# Patient Record
Sex: MALE | Race: WHITE | NOT HISPANIC OR LATINO | Employment: FULL TIME | URBAN - METROPOLITAN AREA
[De-identification: names, ages, dates, MRNs, and addresses within clinical notes are randomized per-mention and may not be internally consistent; named-entity substitution may affect disease eponyms.]

---

## 2017-01-29 ENCOUNTER — APPOINTMENT (OUTPATIENT)
Dept: RADIOLOGY | Facility: MEDICAL CENTER | Age: 32
End: 2017-01-29
Attending: EMERGENCY MEDICINE
Payer: COMMERCIAL

## 2017-01-29 ENCOUNTER — HOSPITAL ENCOUNTER (EMERGENCY)
Facility: MEDICAL CENTER | Age: 32
End: 2017-01-29
Attending: EMERGENCY MEDICINE | Admitting: SPECIALIST
Payer: COMMERCIAL

## 2017-01-29 VITALS
TEMPERATURE: 98.2 F | DIASTOLIC BLOOD PRESSURE: 83 MMHG | BODY MASS INDEX: 24.38 KG/M2 | RESPIRATION RATE: 16 BRPM | SYSTOLIC BLOOD PRESSURE: 145 MMHG | HEART RATE: 101 BPM | HEIGHT: 72 IN | WEIGHT: 180 LBS | OXYGEN SATURATION: 91 %

## 2017-01-29 DIAGNOSIS — F10.920 ALCOHOL INTOXICATION, UNCOMPLICATED (HCC): ICD-10-CM

## 2017-01-29 DIAGNOSIS — S01.511A LACERATION OF VERMILION BORDER OF UPPER LIP, INITIAL ENCOUNTER: ICD-10-CM

## 2017-01-29 DIAGNOSIS — S01.511A LACERATION OF VERMILION BORDER OF LOWER LIP, INITIAL ENCOUNTER: ICD-10-CM

## 2017-01-29 PROCEDURE — 160039 HCHG SURGERY MINUTES - EA ADDL 1 MIN LEVEL 3: Performed by: SPECIALIST

## 2017-01-29 PROCEDURE — 99291 CRITICAL CARE FIRST HOUR: CPT

## 2017-01-29 PROCEDURE — 110371 HCHG SHELL REV 272: Performed by: SPECIALIST

## 2017-01-29 PROCEDURE — 160036 HCHG PACU - EA ADDL 30 MINS PHASE I: Performed by: SPECIALIST

## 2017-01-29 PROCEDURE — 160028 HCHG SURGERY MINUTES - 1ST 30 MINS LEVEL 3: Performed by: SPECIALIST

## 2017-01-29 PROCEDURE — 501838 HCHG SUTURE GENERAL: Performed by: SPECIALIST

## 2017-01-29 PROCEDURE — 700105 HCHG RX REV CODE 258: Performed by: EMERGENCY MEDICINE

## 2017-01-29 PROCEDURE — 96361 HYDRATE IV INFUSION ADD-ON: CPT

## 2017-01-29 PROCEDURE — 160009 HCHG ANES TIME/MIN: Performed by: SPECIALIST

## 2017-01-29 PROCEDURE — 160035 HCHG PACU - 1ST 60 MINS PHASE I: Performed by: SPECIALIST

## 2017-01-29 PROCEDURE — 160002 HCHG RECOVERY MINUTES (STAT): Performed by: SPECIALIST

## 2017-01-29 PROCEDURE — 70486 CT MAXILLOFACIAL W/O DYE: CPT

## 2017-01-29 PROCEDURE — 700111 HCHG RX REV CODE 636 W/ 250 OVERRIDE (IP): Performed by: EMERGENCY MEDICINE

## 2017-01-29 PROCEDURE — 70450 CT HEAD/BRAIN W/O DYE: CPT

## 2017-01-29 PROCEDURE — A4606 OXYGEN PROBE USED W OXIMETER: HCPCS | Performed by: SPECIALIST

## 2017-01-29 PROCEDURE — 96374 THER/PROPH/DIAG INJ IV PUSH: CPT

## 2017-01-29 PROCEDURE — 110382 HCHG SHELL REV 271: Performed by: SPECIALIST

## 2017-01-29 PROCEDURE — 700111 HCHG RX REV CODE 636 W/ 250 OVERRIDE (IP)

## 2017-01-29 PROCEDURE — 500888 HCHG PACK, MINOR BASIN: Performed by: SPECIALIST

## 2017-01-29 PROCEDURE — 700101 HCHG RX REV CODE 250

## 2017-01-29 PROCEDURE — 64400 NJX AA&/STRD TRIGEMINAL NRV: CPT

## 2017-01-29 PROCEDURE — 160048 HCHG OR STATISTICAL LEVEL 1-5: Performed by: SPECIALIST

## 2017-01-29 RX ORDER — ONDANSETRON 2 MG/ML
INJECTION INTRAMUSCULAR; INTRAVENOUS
Status: COMPLETED
Start: 2017-01-29 | End: 2017-01-29

## 2017-01-29 RX ORDER — SODIUM CHLORIDE 9 MG/ML
1000 INJECTION, SOLUTION INTRAVENOUS ONCE
Status: COMPLETED | OUTPATIENT
Start: 2017-01-29 | End: 2017-01-29

## 2017-01-29 RX ORDER — MIDAZOLAM HYDROCHLORIDE 1 MG/ML
INJECTION INTRAMUSCULAR; INTRAVENOUS
Status: DISCONTINUED
Start: 2017-01-29 | End: 2017-01-29 | Stop reason: HOSPADM

## 2017-01-29 RX ADMIN — FENTANYL CITRATE 25 MCG: 50 INJECTION, SOLUTION INTRAMUSCULAR; INTRAVENOUS at 11:15

## 2017-01-29 RX ADMIN — ONDANSETRON 4 MG: 2 INJECTION INTRAMUSCULAR; INTRAVENOUS at 11:07

## 2017-01-29 RX ADMIN — SODIUM CHLORIDE 1000 ML: 900 INJECTION, SOLUTION INTRAVENOUS at 07:15

## 2017-01-29 RX ADMIN — FENTANYL CITRATE 50 MCG: 50 INJECTION, SOLUTION INTRAMUSCULAR; INTRAVENOUS at 02:53

## 2017-01-29 ASSESSMENT — PAIN SCALES - GENERAL
PAINLEVEL_OUTOF10: 7
PAINLEVEL_OUTOF10: 6
PAINLEVEL_OUTOF10: ASSUMED PAIN PRESENT
PAINLEVEL_OUTOF10: 8
PAINLEVEL_OUTOF10: ASSUMED PAIN PRESENT
PAINLEVEL_OUTOF10: 0
PAINLEVEL_OUTOF10: 6

## 2017-01-29 NOTE — OP REPORT
DATE OF SERVICE:  01/29/2017    PREOPERATIVE DIAGNOSES:  1.  Complex laceration, right upper lip, 5 cm.  2.  Laceration, right cheek, 2 cm.  3.  Laceration, left oral commissure 2 cm.    POSTOPERATIVE DIAGNOSES:  1.  Complex laceration, right upper lip, 5 cm.  2.  Laceration, right cheek, 2 cm.  3.  Laceration, left oral commissure, 2 cm.  4.  'Through-and-through laceration, right upper lip.    SURGEON:  Omid Flowers MD    ANESTHESIOLOGIST:  Sue Ann MD    ANESTHESIA:  General endotracheal tube.    COMPLICATIONS:  None.    ESTIMATED BLOOD LOSS:  Less than 30 mL    INDICATIONS:  The patient is a 31-year-old male who sustained a significant   laceration to the right upper lip at the deep laceration through and through   involved both vermilion borders on the medial and lateral aspect of the   laceration.  He has 2 other lacerations, one on the right cheek, which is a   Simple type injury and then another one in the left oral commissure, which is a   multi-lacerated tear type injury.  The patient is here for operative washout,   debridement and repair of the above injuries.  Risks and benefits have been   explained, most notably scar formation, possible need for revisionary surgery,   possible numbness, possible difficulty with orbicularis muscle due to the   fact that it has been left torn in 2 different areas.  The patient understands   the risks and wished to proceed.    FINDINGS:  As above.    DESCRIPTION OF PROCEDURE:  The patient was taken to the operating room and   general anesthesia was induced, 1 gram Ancef was given prior to starting the   procedure.  Initially, we irrigated out the wounds removed any foreign body   that we saw, did sharply debride some of the edges of both the right upper lip   laceration and the right cheek laceration, discover that the laceration went   all the way through had lacerated the mucosa of the buccal mucosa.  Once again   assessed and cleaned all the injuries.   Initially, I used a 4-0 Vicryl suture   to reapproximate the orbicularis muscle on the deep surface of the upper lip.    We then did a second layer in the more superficial layer of the lip trying   to reapproximate the superior aspect of the orbicularis muscle and then   finally I used some 5-0 Vicryl sutures to approximate the mucosal edges.  I   also used 5-0 Vicryl sutures in the right cheek area in the deep dermal layer.    A 5-0 Prolene suture was used on the lip proper on the right side all the   way up to the vermilion border.  I had realigned the vermilion border as even   as I thought I could get.  There was some crush injury to the incision were   made precise vermilion border reconstruction somewhat difficult.  Laterally, I   felt that I was able to align up the vermilion border pretty well.  I used   some chromic sutures in the buccal mucosa to close it to make it watertight,   also used the chromic suture in the red portion of the lip and the left oral   commissure.  All sutures were interrupted fashion, too many sutures to count   on his cheek, one-side close the deep dermal layer.  I used a 5-0 Prolene to   close the skin edges.  Once the patient was cleaned up, removed the throat   pack that we had placed to start the case  I sucked out his stomach.  I injected some 1% lidocaine with   epinephrine at the beginning of the case for postoperative pain control.  The   patient was extubated in the operating room, returned to the PACU in stable   condition.  He tolerated the procedure well.       ____________________________________     MD BREE YU / LANDON    DD:  01/29/2017 11:04:39  DT:  01/29/2017 13:00:33    D#:  [id number]  Job#:  [id number]

## 2017-01-29 NOTE — ED NOTES
Laying in bed, in room with eyes closed.  Resp even et. Nonlabored, call light in reach, wife at bedside.

## 2017-01-29 NOTE — ED NOTES
Chief Complaint   Patient presents with   • Mouth Laceration     Pt reports he fell on the ice, hit his mouth and has a cut to the upper lip, also states he lost some of his fake teeth when he fell   • Fall     Slipped on ice

## 2017-01-29 NOTE — ED NOTES
The Medication Reconciliation has been completed per patient  Allergies have been reviewed  Antibiotic use in 30 days - NONE    Pharmacy:  NONE

## 2017-01-29 NOTE — DISCHARGE INSTRUCTIONS
ACTIVITY: Rest and take it easy for the first 24 hours.  A responsible adult is recommended to remain with you during that time.  It is normal to feel sleepy.  We encourage you to not do anything that requires balance, judgment or coordination.    MILD FLU-LIKE SYMPTOMS ARE NORMAL. YOU MAY EXPERIENCE GENERALIZED MUSCLE ACHES, THROAT IRRITATION, HEADACHE AND/OR SOME NAUSEA.    FOR 24 HOURS DO NOT:  Drive, operate machinery or run household appliances.  Drink beer or alcoholic beverages.   Make important decisions or sign legal documents.    SPECIAL INSTRUCTIONS: Elevate head of bed 30 degrees when resting or sleeping (prop up on several pillows)    DIET: To avoid nausea, slowly advance diet as tolerated, avoiding spicy or greasy foods for the first day.  Add more substantial food to your diet according to your physician's instructions. INCREASE FLUIDS AND FIBER TO AVOID CONSTIPATION.    SURGICAL DRESSING/BATHING: Keep dressing clean, dry and intact until otherwise instructed by Dr Flowers. May shower after 48 hours.     FOLLOW-UP APPOINTMENT:  A follow-up appointment should be arranged with your doctor in office as instructed; call to schedule.    You should CALL YOUR PHYSICIAN if you develop:  Fever greater than 101 degrees F.  Pain not relieved by medication, or persistent nausea or vomiting.  Excessive bleeding (blood soaking through dressing) or unexpected drainage from the wound.  Extreme redness or swelling around the incision site, drainage of pus or foul smelling drainage.  Inability to urinate or empty your bladder within 8 hours.  Problems with breathing or chest pain.    You should call 911 if you develop problems with breathing or chest pain.  If you are unable to contact your doctor or surgical center, you should go to the nearest emergency room or urgent care center.  Dr Flowers's telephone #: [phone]    If any questions arise, call your doctor.  If your doctor is not available, please feel free to  call the Surgical Center at [phone].  The Center is open Monday through Friday from 7AM to 7PM.  You can also call the HEALTH HOTLINE open 24 hours/day, 7 days/week and speak to a nurse at [phone], or toll free at [phone].    A registered nurse may call you a few days after your surgery to see how you are doing after your procedure.    MEDICATIONS: Resume taking daily medication.  Take prescribed pain medication with food.  If no medication is prescribed, you may take non-aspirin pain medication if needed.  PAIN MEDICATION CAN BE VERY CONSTIPATING.  Take a stool softener or laxative such as senokot, pericolace, or milk of magnesia if needed.    Prescription given for home.  Last pain medication given at n/a.  Start taking oral antibiotics at 5PM    If your physician has prescribed pain medication that includes Acetaminophen (Tylenol), do not take additional Acetaminophen (Tylenol) while taking the prescribed medication.    Depression / Suicide Risk    As you are discharged from this [facility] facility, it is important to learn how to keep safe from harming yourself.    Recognize the warning signs:  · Abrupt changes in personality, positive or negative- including increase in energy   · Giving away possessions  · Change in eating patterns- significant weight changes-  positive or negative  · Change in sleeping patterns- unable to sleep or sleeping all the time   · Unwillingness or inability to communicate  · Depression  · Unusual sadness, discouragement and loneliness  · Talk of wanting to die  · Neglect of personal appearance   · Rebelliousness- reckless behavior  · Withdrawal from people/activities they love  · Confusion- inability to concentrate     If you or a loved one observes any of these behaviors or has concerns about self-harm, here's what you can do:  · Talk about it- your feelings and reasons for harming yourself  · Remove any means that you might use to hurt yourself (examples:  pills, rope, extension cords, firearm)  · Get professional help from the community (Mental Health, Substance Abuse, psychological counseling)  · Do not be alone:Call your Safe Contact- someone whom you trust who will be there for you.  · Call your local CRISIS HOTLINE 195-4280 or 210-107-0021  · Call your local Children's Mobile Crisis Response Team [place] (529) 393-1165 or www.[employer]  · Call the toll free National Suicide Prevention Hotlines   · National Suicide Prevention Lifeline 919-628-MPUM (3768)  · National Hope Line Network 800-SUICIDE (527-7896)

## 2017-01-29 NOTE — OR NURSING
"1032 To PACU from OR via [facility], side rails up x 2 for safety, lungs clear bilaterally, scds on patient and machine operational, dressing CDI to upper lip and R cheek. Upper lip pink/warm with <3 sec cap refill. Pt arouses to voice but does not answer appropriately and eyes close quickly with stimulation. Mild snoring noted. HOB elevated at 30 degrees.   1045 No changes  1100 Arouses to voice and responds appropriately to RN. Denies pain or nausea. Able to follow commands. Eyes close quickly when stimulated.   1115 Pt arouses easily to voice and c/o 7/10 pain to face arouse mouth. Reports nausea; given IV Zofran and IV Fentanyl for comfort. Breathing easy and unlabored.   1130 Repositioned on [facility] for effective ventilation. Instructed to DB/C and demonstrated understanding. Pt reports nausea as \"better\" and tolerating ice chips. Pain rated as 6/10 and \"not tolerable\" but eyes continue to close quickly when unstimulated and mild snoring noted quickly.   1145 HOB elevated to 45 degrees and patient able to remain awake to converse with RN. Reports nausea as resolved and tolerating ice chips. Pain rated as tolerable. Instructed to DB/C; demonstrated understanding.   1200 Pt reports pain is tolerable and denies nausea. Able to sip water through a straw. Titrated to RA and will monitor.   1215 No desaturations noted; up to BR with SBA. Dressing remains CDI to face. Pt meets criteria for transfer to stage II but will discharge from PACU.  Pt dressed with assist by RN/family. Pain rated as tolerable and denies nausea.   1220 Able to void without difficulty.   1229 D/Yogesh to care of family post uneventful stay in PACU 2.  "

## 2017-01-29 NOTE — H&P
PATIENT IDENTIFICATION:  The patient is a 31-year-old male.    CHIEF COMPLAINT:  Severe laceration, right upper lip, 5 cm.    HISTORY OF PRESENT ILLNESS:  The patient is a 31-year-old male who slipped on   the ice last evening, fell, face first onto the ground and sustained a   significant laceration of the right upper lip area.  He was seen at [facility].  Plastic surgery was consulted due to the complexity of the   laceration.  Patient is awake, alert, and oriented.  He is accompanied by his   wife, plan on surgical repair of the full thickness lip laceration in the   operating room.  I discussed the risks and benefits with the patient and his   wife.  They understand and wish to proceed.    PAST MEDICAL HISTORY:  Noncontributory.    ALLERGIES:  None.    MEDICATIONS:  None.    SOCIAL HISTORY:  The patient is a nonsmoker, occasional ETOH.    PHYSICAL EXAMINATION:  GENERAL:  Healthy-appearing 31-year-old male.  HEENT:  Shows a laceration that extends from the philtral column on the right   side all the way across to his lip almost to the oral commissure it crosses   the vermilion border two areas, it is deep down through the orbicularis   muscle.  He has slight movement of the orbicularis that is still intact with   no sensation to the lip below the laceration.  At this point, i don't see any new  dental injuries, but the patient has a partial denture that may have been  broken in the fall.  He has a swollen right cheek area with some pain on palpation.    CT scan showed blood in the right maxillary sinus, but no obvious fracture.  LUNGS:  Clear to auscultation.  CARDIOVASCULAR:  Regular rate and rhythm.  ABDOMEN:  Benign.    IMPRESSION:  A 31-year-old male with a significant laceration to the right   upper lip.    PLAN:  Operative exploration, irrigation, debridement, plastic surgery, and   complex closure.  Risks and benefits have been explained to the patient and   his wife and they understand and  wish to proceed.       ____________________________________     MD BREE YU    DD:  01/29/2017 08:47:06  DT:  01/29/2017 09:36:23    D#:  [id number]  Job#:  [id number]

## 2017-01-29 NOTE — OR SURGEON
Immediate Post-Operative Note      PreOp Diagnosis: complex laceration right upper lip (9cm total), right cheek, left oral commisure    PostOp Diagnosis: same    Procedure(s):  LACERATION REPAIR- complex repair upper lip  - Wound Class: Contaminated    Surgeon(s):  Omid Flowers M.D.    Anesthesiologist/Type of Anesthesia:  Anesthesiologist: Sue Ann M.D./General    Surgical Staff:  Circulator: Kaylee Ascencio R.N.  Scrub Person: Hardik Baileyisge    Specimen:     Estimated Blood Loss: 20cc    Findings:     Complications: none        1/29/2017 11:00 AM Omid Flowers

## 2017-01-29 NOTE — IP AVS SNAPSHOT
1/29/2017          Ki Alba  [address] [address]    Dear Ki:    [facility] wants to ensure your discharge home is safe and you or your loved ones have had all your questions answered regarding your care after you leave the hospital.    You may receive a telephone call within two days of your discharge.  This call is to make certain you understand your discharge instructions as well as ensure we provided you with the best care possible during your stay with us.     The call will only last approximately 3-5 minutes and will be done by a nurse.    Once again, we want to ensure your discharge home is safe and that you have a clear understanding of any next steps in your care.  If you have any questions or concerns, please do not hesitate to contact us, we are here for you.  Thank you for choosing [facility] for your healthcare needs.    Sincerely,    Alvaro Navarrete    [facility]

## 2017-01-29 NOTE — IP AVS SNAPSHOT
" After Visit Summary                                                                                                                Name:Ki Alba  Medical Record Number:[id number]  CSN: [id number]    YOB: 1985   Age: 31 y.o.  Sex: male  HT:1.822 m (5' 11.75\") WT: 81.647 kg (180 lb)          Admit Date: 1/29/2017     Discharge Date:   Today's Date: 1/29/2017  Attending Doctor:  Gina att. providers found                  Allergies:  Review of patient's allergies indicates no known allergies.                Discharge Instructions         ACTIVITY: Rest and take it easy for the first 24 hours.  A responsible adult is recommended to remain with you during that time.  It is normal to feel sleepy.  We encourage you to not do anything that requires balance, judgment or coordination.    MILD FLU-LIKE SYMPTOMS ARE NORMAL. YOU MAY EXPERIENCE GENERALIZED MUSCLE ACHES, THROAT IRRITATION, HEADACHE AND/OR SOME NAUSEA.    FOR 24 HOURS DO NOT:  Drive, operate machinery or run household appliances.  Drink beer or alcoholic beverages.   Make important decisions or sign legal documents.    SPECIAL INSTRUCTIONS: Elevate head of bed 30 degrees when resting or sleeping (prop up on several pillows)    DIET: To avoid nausea, slowly advance diet as tolerated, avoiding spicy or greasy foods for the first day.  Add more substantial food to your diet according to your physician's instructions. INCREASE FLUIDS AND FIBER TO AVOID CONSTIPATION.    SURGICAL DRESSING/BATHING: Keep dressing clean, dry and intact until otherwise instructed by Dr Flowers. May shower after 48 hours.     FOLLOW-UP APPOINTMENT:  A follow-up appointment should be arranged with your doctor in office as instructed; call to schedule.    You should CALL YOUR PHYSICIAN if you develop:  Fever greater than 101 degrees F.  Pain not relieved by medication, or persistent nausea or vomiting.  Excessive bleeding (blood soaking through dressing) or unexpected drainage " from the wound.  Extreme redness or swelling around the incision site, drainage of pus or foul smelling drainage.  Inability to urinate or empty your bladder within 8 hours.  Problems with breathing or chest pain.    You should call 911 if you develop problems with breathing or chest pain.  If you are unable to contact your doctor or surgical center, you should go to the nearest emergency room or urgent care center.  Dr Flowers's telephone #: [phone]    If any questions arise, call your doctor.  If your doctor is not available, please feel free to call the Surgical Center at [phone].  The Center is open Monday through Friday from 7AM to 7PM.  You can also call the HEALTH HOTLINE open 24 hours/day, 7 days/week and speak to a nurse at [phone], or toll free at [phone].    A registered nurse may call you a few days after your surgery to see how you are doing after your procedure.    MEDICATIONS: Resume taking daily medication.  Take prescribed pain medication with food.  If no medication is prescribed, you may take non-aspirin pain medication if needed.  PAIN MEDICATION CAN BE VERY CONSTIPATING.  Take a stool softener or laxative such as senokot, pericolace, or milk of magnesia if needed.    Prescription given for home.  Last pain medication given at n/a.  Start taking oral antibiotics at 5PM    If your physician has prescribed pain medication that includes Acetaminophen (Tylenol), do not take additional Acetaminophen (Tylenol) while taking the prescribed medication.    Depression / Suicide Risk    As you are discharged from this [facility] facility, it is important to learn how to keep safe from harming yourself.    Recognize the warning signs:  · Abrupt changes in personality, positive or negative- including increase in energy   · Giving away possessions  · Change in eating patterns- significant weight changes-  positive or negative  · Change in sleeping patterns- unable to sleep or sleeping all  the time   · Unwillingness or inability to communicate  · Depression  · Unusual sadness, discouragement and loneliness  · Talk of wanting to die  · Neglect of personal appearance   · Rebelliousness- reckless behavior  · Withdrawal from people/activities they love  · Confusion- inability to concentrate     If you or a loved one observes any of these behaviors or has concerns about self-harm, here's what you can do:  · Talk about it- your feelings and reasons for harming yourself  · Remove any means that you might use to hurt yourself (examples: pills, rope, extension cords, firearm)  · Get professional help from the community (Mental Health, Substance Abuse, psychological counseling)  · Do not be alone:Call your Safe Contact- someone whom you trust who will be there for you.  · Call your local CRISIS HOTLINE 423-5024 or 705-380-7878  · Call your local Children's Mobile Crisis Response Team [place] (411) 629-3462 or www.Longaccess  · Call the toll free National Suicide Prevention Hotlines   · National Suicide Prevention Lifeline 009-854-ANHQ (7323)  · [place] Hope Line Network 800-SUICIDE (687-6922)       Medication List      CONTINUE taking these medications        Instructions    VICKS NYQUIL COLD & FLU 15-6. MG/15ML Liqd   Generic drug:  DM-Doxylamine-Acetaminophen    Take 1 Dose by mouth every 6 hours as needed.   Dose:  1 Dose               Medication Information     Above and/or attached are the medications your physician expects you to take upon discharge. Review all of your home medications and newly ordered medications with your doctor and/or pharmacist. Follow medication instructions as directed by your doctor and/or pharmacist. Please keep your medication list with you and share with your physician. Update the information when medications are discontinued, doses are changed, or new medications (including over-the-counter products) are added; and carry medication information at all times  in the event of emergency situations.        Resources     Quit Smoking / Tobacco Use:    I understand the use of any tobacco products increases my chance of suffering from future heart disease or stroke and could cause other illnesses which may shorten my life. Quitting the use of tobacco products is the single most important thing I can do to improve my health. For further information on smoking / tobacco cessation call a Toll Free Quit Line at 1-923.253.2007 (*[employer]) or 1-690.356.8492 (American Lung Association) or you can access the web based program at www.lungusa.org.    [place] Tobacco Users Help Line:  (880) 654-2848       Toll Free: 1-937.509.9197  Quit Tobacco Program [facility] (341)991-1330    Crisis Hotline:    [place] Crisis Hotline:  8-610-MFCVOYB or 1-196.614.4473    [place] Crisis Hotline:    1-672.552.1953 or 162-520-9399    Discharge Survey:   Thank you for choosing [facility]. We hope we did everything we could to make your hospital stay a pleasant one. You may be receiving a survey and we would appreciate your time and participation in answering the questions. Your input is very valuable to us in our efforts to improve our service to our patients and their families.            Signatures     My signature on this form indicates that:    1. I acknowledge receipt and understanding of these Home Care Instruction.  2. My questions regarding this information have been answered to my satisfaction.  3. I have formulated a plan with my discharge nurse to obtain my prescribed medications for home.    __________________________________      __________________________________                   Patient Signature                                 Guardian/Responsible Adult Signature      __________________________________                 __________       ________                       Nurse Signature                                               Date                  Time

## 2017-01-29 NOTE — ED PROVIDER NOTES
"ED Provider Note    CHIEF COMPLAINT  Chief Complaint   Patient presents with   • Mouth Laceration     Pt reports he fell on the ice, hit his mouth and has a cut to the upper lip, also states he lost some of his fake teeth when he fell   • Fall     Slipped on ice       HPI  Ki Alba is a 31 y.o. male who presents to the emergency Department chief complaint of facial laceration. Patient was intoxicated this evening slipped on the ice and hit his face on the ground is a large laceration to his upper lip as well as a small amount of his lower lip and knocked out 2 of his fake teeth. Patient states he lost his teeth and prior trauma and has to frequently implanted and lost on the fall today unknown loss of consciousness friend thinks he might have lost consciousness mildly but he has not been vomiting patient only endorses pain to the mouth and jaw but denies any pain to his neck. He states he's had several drinks this evening and cannot quantify how much friend states they've been drinking all evening and the last time he was at 9 PM. His tetanus is up-to-date within the last 5 years otherwise has no medical problems and takes no medications pain is currently an 8 out of 10 in his upper lip and face.    REVIEW OF SYSTEMS  See HPI for further details. All other systems are negative.     PAST MEDICAL HISTORY       SOCIAL HISTORY  Social History     Social History Main Topics   • Smoking status: Current Every Day Smoker   • Smokeless tobacco: Current User   • Alcohol Use: Yes      Comment: Daily   • Drug Use: Yes     Special: Inhaled      Comment: \"Pot daily\"   • Sexual Activity: Not on file       SURGICAL HISTORY  patient denies any surgical history    CURRENT MEDICATIONS  Home Medications     Reviewed by Maryam Carter R.N. (Registered Nurse) on 01/29/17 at 0239  Med List Status: Complete    Medication Last Dose Status          Patient Genaro Taking any Medications                        ALLERGIES  No Known " "Allergies    PHYSICAL EXAM  VITAL SIGNS: /96 mmHg  Pulse 93  Temp(Src) 36.7 °C (98.1 °F)  Resp 18  Ht 1.822 m (5' 11.75\")  Wt 81.647 kg (180 lb)  BMI 24.59 kg/m2  SpO2 94%   Pulse ox interpretation: I interpret this pulse ox as normal.  Constitutional: Alert in no apparent distress.  HENT:  Bilateral external ears normal, Nose normal. ~ 5cm laceration to the upper lip through the vermilion border, thru and thru ~ 2cm on the inside, left lower lip with a small 3 mm laceration involving corner of the lip and the vermilion border  Teeth 7 and 10 missing all other teeth in well alignment without maxillary movements and normal jaw alignment stability  Eyes: Pupils are equal and reactive, Conjunctiva normal, Non-icteric.   Neck: Normal range of motion, No tenderness, Supple, No stridor.   Lymphatic: No lymphadenopathy noted.   Cardiovascular: Regular rate and rhythm, no murmurs.   Thorax & Lungs: Normal breath sounds, No respiratory distress, No wheezing, No chest tenderness.   Abdomen: Bowel sounds normal, Soft, No tenderness, No masses, No pulsatile masses. No peritoneal signs.  Skin: Warm, Dry, No erythema, No rash.   Back: No bony tenderness, No CVA tenderness.   Extremities: Intact distal pulses, No edema, No tenderness, No cyanosis,  Negative Timbo's sign.   Musculoskeletal: Good range of motion in all major joints. No tenderness to palpation or major deformities noted.   Neurologic: Alert , Normal motor function, Normal sensory function, No focal deficits noted.   Psychiatric: Affect normal, Judgment normal, Mood normal.       DIFFERENTIAL DIAGNOSIS AND WORK UP PLAN    This is a 31 y.o. male who presents with coronal fall and alcohol intoxication extremely complex laceration of the upper lip as well as a small one involving the lower lip. His tetanus is up to date however he does have a ground-level fall with intoxication and no loss consciousness we'll perform a head CT as well as CT max face however " "at this time his face does not show any signs of LeFort fracture is still concerning due to the fall. His teeth are well aligned his jaws also well aligned will evaluate to ensure there is no occult fractures there as well. Will discuss case with on-call plastic surgery for repair.    DIAGNOSTIC STUDIES / PROCEDURES    LABS  Pertinent Lab Findings    Labs Reviewed - No data to display    RADIOLOGY  CT-MAXILLOFACIAL W/O PLUS RECONS   Preliminary Result      CT-HEAD W/O    (Results Pending)     The radiologist's interpretation of all radiological studies have been reviewed by me.      COURSE & MEDICAL DECISION MAKING  Pertinent Labs & Imaging studies reviewed. (See chart for details)    Procedure  Dental Block:  2:53 AM performed by Dr Heredia  Sterile process was used  Indication: lip laceration  Consetnt: verbal from the patient  Location: bilateral infraorbital nerves  Anesthesia: 1% lido without epi 3cc's, bilaterally  Toleration: the patient tolerated well, no immediate complications or bleeding  Total procedure time: 7 minutes    Pending call back from the plastics, currently the patient is refusing CT scan of head and max face    3:11 AM  Spoke w Dr Flowers patient is currently intoxicated and he would like the patient to sober up quite a bit will taken to the OR this morning at 8 AM for repair. The patient is to remain nothing by mouth at this time, discussed the case and plan with the patient and he agrees to sit in the ED until he can be taken the operating room at 8 AM this morning.    The patient continues to refuse a head CT or CT max face stating \"I'm fine I don't need a head CT and if I die because of blood in my brain I understand it's my fault\".      The patient remains calm and resting awaiting OR This morning - discussed the case w the patients wife and she understands the plan    Dispo    To the OR w Dr Flowers for repair    FINAL IMPRESSION  1. Laceration of vermilion border of upper lip, initial " encounter    2. Alcohol intoxication, uncomplicated (CMS-HCC)    3. Laceration of vermilion border of lower lip, initial encounter              Electronically signed by: Slime Heredia, 1/29/2017 2:43 AM    This dictation has been created using voice recognition software and/or scribes. The accuracy of the dictation is limited by the abilities of the software and the expertise of the scribes. I expect there may be some errors of grammar and possibly content. I made every attempt to manually correct the errors within my dictation. However, errors related to voice recognition software and/or scribes may still exist and should be interpreted within the appropriate context.

## 2017-02-06 ENCOUNTER — HOSPITAL ENCOUNTER (OUTPATIENT)
Dept: LAB | Facility: MEDICAL CENTER | Age: 32
End: 2017-02-06
Payer: COMMERCIAL

## 2017-02-06 LAB
BDY FAT % MEASURED: 19.6 %
BP DIAS: 61 MMHG
BP SYS: 121 MMHG
CHOLEST SERPL-MCNC: 155 MG/DL (ref 100–199)
DIABETES HTDIA: NO
EVENT NAME HTEVT: NORMAL
FASTING HTFAS: YES
GLUCOSE SERPL-MCNC: 73 MG/DL (ref 65–99)
HDLC SERPL-MCNC: 50 MG/DL
HYPERTENSION HTHYP: NO
LDLC SERPL CALC-MCNC: 86 MG/DL
SCREENING LOC CITY HTCIT: NORMAL
SCREENING LOC STATE HTSTA: NORMAL
SCREENING LOCATION HTLOC: NORMAL
SMOKING HTSMO: NORMAL
SUBSCRIBER ID HTSID: NORMAL
TRIGL SERPL-MCNC: 94 MG/DL (ref 0–149)

## 2017-02-06 PROCEDURE — S5190 WELLNESS ASSESSMENT BY NONPH: HCPCS

## 2017-02-06 PROCEDURE — 82947 ASSAY GLUCOSE BLOOD QUANT: CPT

## 2017-02-06 PROCEDURE — 36415 COLL VENOUS BLD VENIPUNCTURE: CPT

## 2017-02-06 PROCEDURE — 80061 LIPID PANEL: CPT

## 2017-11-30 ENCOUNTER — OFFICE VISIT (OUTPATIENT)
Dept: URGENT CARE | Facility: PHYSICIAN GROUP | Age: 32
End: 2017-11-30
Payer: COMMERCIAL

## 2017-11-30 VITALS
OXYGEN SATURATION: 97 % | HEART RATE: 95 BPM | RESPIRATION RATE: 16 BRPM | DIASTOLIC BLOOD PRESSURE: 80 MMHG | HEIGHT: 71 IN | WEIGHT: 187 LBS | BODY MASS INDEX: 26.18 KG/M2 | TEMPERATURE: 98.1 F | SYSTOLIC BLOOD PRESSURE: 124 MMHG

## 2017-11-30 DIAGNOSIS — J01.00 ACUTE MAXILLARY SINUSITIS, RECURRENCE NOT SPECIFIED: ICD-10-CM

## 2017-11-30 LAB
FLUAV+FLUBV AG SPEC QL IA: NEGATIVE
INT CON NEG: NEGATIVE
INT CON POS: POSITIVE

## 2017-11-30 PROCEDURE — 99204 OFFICE O/P NEW MOD 45 MIN: CPT | Performed by: FAMILY MEDICINE

## 2017-11-30 PROCEDURE — 87804 INFLUENZA ASSAY W/OPTIC: CPT | Performed by: FAMILY MEDICINE

## 2017-11-30 RX ORDER — BENZONATATE 100 MG/1
100 CAPSULE ORAL 3 TIMES DAILY PRN
Qty: 60 CAP | Refills: 0 | Status: SHIPPED | OUTPATIENT
Start: 2017-11-30

## 2017-11-30 RX ORDER — AMOXICILLIN AND CLAVULANATE POTASSIUM 875; 125 MG/1; MG/1
1 TABLET, FILM COATED ORAL 2 TIMES DAILY
Qty: 20 TAB | Refills: 0 | Status: SHIPPED | OUTPATIENT
Start: 2017-11-30 | End: 2017-12-10

## 2017-12-01 NOTE — PROGRESS NOTES
"  Chief Complaint   Patient presents with   • Cough     cough, fever, vomitting,  , x9days        Cough  This is a new problem. The current episode started 7 days ago. The problem has been gradually worsening. The problem occurs constantly. The cough is productive of yellow sputum. Associated symptoms include : headaches, fatigue, nasal congestion.   No objective fevers at home, but has felt \"feverish\".    States cough is making it hard to sleep at night.   Pertinent negatives include no   nausea, vomiting, diarrhea, sweats, weight loss or wheezing.  + loose stools.   Nothing aggravates the symptoms.  Patient has tried several OTC cold products for the symptoms - no improvement. There is no history of asthma.        Past medical history was unremarkable and not pertinent to current issue  Family history was reviewed and not pertinent       Social History   Substance Use Topics   • Smoking status: Current Every Day Smoker   • Smokeless tobacco: Current User   • Alcohol use Yes      Comment: Daily             Review of Systems   Constitutional: Negative for fever, chills and weight loss.   HENT - denies  ear pain.   Positive congestion, sore throat  Eyes: denies vision changes, discharge  Respiratory: Negative for hemoptysis and wheezing.    Cardiovascular: Negative for chest pain or PND.   Gastrointestinal:  No abdominal pain,  nausea, vomiting.  Negative for  blood in stool.    - no discharge, dysuria, frequency.      Neurological: Negative for dizziness.   + headaches.   musculoskeletal - denies myalgias, calf pain  Psych - denies anxiety/depression/mood changes.  Skin: no itching or rash  All other systems reviewed and are negative.             Objective:     Blood pressure 124/80, pulse 95, temperature 36.7 °C (98.1 °F), resp. rate 16, height 1.803 m (5' 11\"), weight 84.8 kg (187 lb), SpO2 97 %.    Physical Exam   Constitutional: patient is oriented to person, place, and time. Patient appears well-developed and " well-nourished. No distress.   HENT:   Head: Normocephalic and atraumatic.   Right Ear: External ear normal.   Left Ear: External ear normal.   TMs both normal  Nose: Mucosal edema  present.   There is tenderness to percussion over left and right sinuses  Mouth/Throat: Mucous membranes are normal. No oral lesions.  No posterior pharyngeal erythema.  No oropharyngeal exudate or posterior oropharyngeal edema.   Eyes: Conjunctivae and EOM are normal. Pupils are equal, round, and reactive to light. Right eye exhibits no discharge. Left eye exhibits no discharge. No scleral icterus.   Neck: Normal range of motion. Neck supple. No tracheal deviation present.   Cardiovascular: Normal rate, regular rhythm and normal heart sounds.  Exam reveals no friction rub.    Pulmonary/Chest: Effort normal. No respiratory distress. Patient has no wheezes or rhonchi. Patient has no rales.    Musculoskeletal:  exhibits no edema.   Lymphadenopathy:     Patient has  cervical adenopathy.      Neurological: patient is alert and oriented to person, place, and time.   CN 2-12 intact  Skin: Skin is warm and dry. No rash noted. No erythema.   Psychiatric: patient  has a normal mood and affect.  behavior is normal.   Nursing note and vitals reviewed.              Assessment/Plan:       1. Acute maxillary sinusitis, recurrence not specified     - amoxicillin-clavulanate (AUGMENTIN) 875-125 MG Tab; Take 1 Tab by mouth 2 times a day for 10 days.  Dispense: 20 Tab; Refill: 0  - POCT Influenza A/B neg  - benzonatate (TESSALON) 100 MG Cap; Take 1 Cap by mouth 3 times a day as needed for Cough.  Dispense: 60 Cap; Refill: 0        Follow up in one week if no improvement, sooner if symptoms worsen.

## 2018-04-07 ENCOUNTER — OFFICE VISIT (OUTPATIENT)
Dept: URGENT CARE | Facility: PHYSICIAN GROUP | Age: 33
End: 2018-04-07
Payer: COMMERCIAL

## 2018-04-07 VITALS
BODY MASS INDEX: 29.68 KG/M2 | HEIGHT: 71 IN | SYSTOLIC BLOOD PRESSURE: 140 MMHG | TEMPERATURE: 99 F | WEIGHT: 212 LBS | DIASTOLIC BLOOD PRESSURE: 78 MMHG | RESPIRATION RATE: 16 BRPM | HEART RATE: 95 BPM | OXYGEN SATURATION: 95 %

## 2018-04-07 DIAGNOSIS — S83.92XA SPRAIN OF LEFT KNEE, UNSPECIFIED LIGAMENT, INITIAL ENCOUNTER: ICD-10-CM

## 2018-04-07 DIAGNOSIS — J02.9 PHARYNGITIS, UNSPECIFIED ETIOLOGY: ICD-10-CM

## 2018-04-07 PROCEDURE — 99214 OFFICE O/P EST MOD 30 MIN: CPT | Performed by: FAMILY MEDICINE

## 2018-04-07 RX ORDER — AMOXICILLIN 500 MG/1
500 CAPSULE ORAL 2 TIMES DAILY
Qty: 14 CAP | Refills: 0 | Status: SHIPPED | OUTPATIENT
Start: 2018-04-07 | End: 2018-04-14

## 2018-04-07 RX ORDER — IBUPROFEN 800 MG/1
800 TABLET ORAL EVERY 8 HOURS PRN
Qty: 60 TAB | Refills: 0 | Status: SHIPPED | OUTPATIENT
Start: 2018-04-07 | End: 2022-04-07

## 2018-04-07 ASSESSMENT — ENCOUNTER SYMPTOMS
VOMITING: 0
EYE REDNESS: 0
ABDOMINAL PAIN: 0
PALPITATIONS: 0
SHORTNESS OF BREATH: 0
COUGH: 0
CHILLS: 0
NECK PAIN: 1
HEADACHES: 0
SORE THROAT: 1
SINUS PAIN: 0
NAUSEA: 0
FEVER: 0
SPUTUM PRODUCTION: 0
EYE DISCHARGE: 0

## 2018-04-07 NOTE — PROGRESS NOTES
"Subjective:      Ki Alba is a 33 y.o. male who presents with Knee Pain (popped when was walking ,sore throat )            Subjective:    Ki Alba is a 33 y.o. male who presents with knee pain involving the  left knee. Onset was sudden, not related to any specific activity. Current symptoms include pain location: left: diffuse  severity of pain = moderate, popping sensation. Pain is aggravated by going up and down stairs, squatting, kneeling, rising after sitting. Patient has had no prior knee problems. Evaluation to date: none. Treatment to date: OTC analgesics: somewhat effective  icing: somewhat effective.    He also has sore throat for last 3 days. Have trouble swallowing. Denies fever, chills, malaise.     No past medical history on file.  Patient Active Problem List    Laceration of upper lip, complicated         Date Noted: 01/29/2017      Past Surgical History:  1/29/2017: LACERATION REPAIR N/A      Comment: Procedure: LACERATION REPAIR- complex repair                upper lip ;  Surgeon: Omid Flowers M.D.;                 Location: SURGERY [facility];  Service:   No family history on file.    Social History    Marital status:              Spouse name:                       Years of education:                 Number of children:               Social History Main Topics    Smoking status: Current Every Day Smoker                                                     Packs/day: 0.00      Years: 0.00        Smokeless tobacco: Current User                      Alcohol use: Yes                Comment: Daily    Drug use: Yes                Types: Inhaled       Comment: \"Pot daily\"    Current Outpatient Prescriptions:  benzonatate (TESSALON) 100 MG Cap, Take 1 Cap by mouth 3 times a day as needed for Cough., Disp: 60 Cap, Rfl: 0  DM-Doxylamine-Acetaminophen (VICKS NYQUIL COLD & FLU) 15-6. MG/15ML Liquid, Take 1 Dose by mouth every 6 hours as needed., Disp: , Rfl:     No " "current facility-administered medications for this visit.     No Known Allergies             Review of Systems   Constitutional: Negative for chills, fever and malaise/fatigue.   HENT: Positive for sore throat. Negative for congestion and sinus pain.    Eyes: Negative for discharge and redness.   Respiratory: Negative for cough, sputum production and shortness of breath.    Cardiovascular: Negative for chest pain and palpitations.   Gastrointestinal: Negative for abdominal pain, nausea and vomiting.   Genitourinary: Negative for dysuria, frequency and urgency.   Musculoskeletal: Positive for joint pain and neck pain.        Left knee pain    Skin: Negative for itching and rash.   Neurological: Negative for headaches.          Objective:     /78   Pulse 95   Temp 37.2 °C (99 °F)   Resp 16   Ht 1.803 m (5' 11\")   Wt 96.2 kg (212 lb)   SpO2 95%   BMI 29.57 kg/m²      Physical Exam   Constitutional: He appears well-developed and well-nourished.   HENT:   Head: Normocephalic and atraumatic.   Right Ear: External ear normal.   Left Ear: External ear normal.   Mouth/Throat: Oropharyngeal exudate and posterior oropharyngeal erythema present. Tonsils are 2+ on the right. Tonsils are 2+ on the left.   Eyes: EOM are normal. Pupils are equal, round, and reactive to light. Right eye exhibits no discharge. Left eye exhibits no discharge.   Neck: Normal range of motion. Neck supple.   Cardiovascular: Normal rate, regular rhythm and normal heart sounds.    No murmur heard.  Pulmonary/Chest: Effort normal and breath sounds normal. No respiratory distress.   Abdominal: Soft. Bowel sounds are normal. He exhibits no distension.   Lymphadenopathy:     He has no cervical adenopathy.   Skin: Skin is warm and dry.          Right knee: negative  Left knee:  no effusion, FROM, ligamentous structures intact.  negative exam findings: no erythemano tendernessACL stablePCL stableno patellar laxityno crepitus, Decreased flexion due " to pain        Assessment/Plan:       X-ray knee: not indicated     Assessment:    Left Knee sprain.  Severity     Plan:    Discussed the diagnosis, usual course and plan for evaluation and tx.  Educational materials distributed.  Advised use of knee sleeve PRN  OTC analgesics PRN       pharyngitis     POC strep neg      Antibiotic as directed. OTC motrin or tylenol for pain/fever control. Hand hygiene. Increase fluid intake, rest. Warm salt water gargles.   Supportive care, differential diagnoses, and indications for immediate follow-up discussed with patient.   Pathogenesis of diagnosis discussed including typical length and natural progression.   Instructed to return to clinic or nearest emergency department for any change in condition, further concerns, or worsening of symptoms.  Patient states understanding of the plan of care and discharge instructions.  Instructed to make an appointment, for follow up, with their primary care provider.      Differential diagnoses, natural history, supportive care discussed  Indications for immediate care in an emergency department, when to return to the urgent care, and when to follow up with primary care provider discussed at length. Patient espressed understanding and agreed to do so.

## 2018-05-05 ENCOUNTER — HOSPITAL ENCOUNTER (EMERGENCY)
Facility: MEDICAL CENTER | Age: 33
End: 2018-05-05
Attending: EMERGENCY MEDICINE
Payer: COMMERCIAL

## 2018-05-05 ENCOUNTER — APPOINTMENT (OUTPATIENT)
Dept: RADIOLOGY | Facility: MEDICAL CENTER | Age: 33
End: 2018-05-05
Attending: EMERGENCY MEDICINE
Payer: COMMERCIAL

## 2018-05-05 VITALS
SYSTOLIC BLOOD PRESSURE: 153 MMHG | HEIGHT: 71 IN | BODY MASS INDEX: 27.99 KG/M2 | OXYGEN SATURATION: 98 % | RESPIRATION RATE: 18 BRPM | WEIGHT: 199.96 LBS | TEMPERATURE: 98.6 F | DIASTOLIC BLOOD PRESSURE: 102 MMHG | HEART RATE: 109 BPM

## 2018-05-05 DIAGNOSIS — S62.667B OPEN NONDISPLACED FRACTURE OF DISTAL PHALANX OF LEFT LITTLE FINGER, INITIAL ENCOUNTER: ICD-10-CM

## 2018-05-05 DIAGNOSIS — S61.319A LACERATION OF NAIL BED OF FINGER, INITIAL ENCOUNTER: ICD-10-CM

## 2018-05-05 PROCEDURE — 700101 HCHG RX REV CODE 250: Performed by: EMERGENCY MEDICINE

## 2018-05-05 PROCEDURE — 304999 HCHG REPAIR-SIMPLE/INTERMED LEVEL 1

## 2018-05-05 PROCEDURE — 73140 X-RAY EXAM OF FINGER(S): CPT | Mod: LT

## 2018-05-05 PROCEDURE — 304217 HCHG IRRIGATION SYSTEM

## 2018-05-05 PROCEDURE — 700111 HCHG RX REV CODE 636 W/ 250 OVERRIDE (IP): Performed by: EMERGENCY MEDICINE

## 2018-05-05 PROCEDURE — 96374 THER/PROPH/DIAG INJ IV PUSH: CPT

## 2018-05-05 PROCEDURE — 99285 EMERGENCY DEPT VISIT HI MDM: CPT

## 2018-05-05 PROCEDURE — 303747 HCHG EXTRA SUTURE

## 2018-05-05 PROCEDURE — 303485 HCHG DRESSING MEDIUM

## 2018-05-05 RX ORDER — CEFAZOLIN SODIUM 2 G/100ML
2 INJECTION, SOLUTION INTRAVENOUS ONCE
Status: COMPLETED | OUTPATIENT
Start: 2018-05-05 | End: 2018-05-05

## 2018-05-05 RX ORDER — CEPHALEXIN 500 MG/1
500 CAPSULE ORAL 4 TIMES DAILY
Qty: 20 CAP | Refills: 0 | Status: SHIPPED | OUTPATIENT
Start: 2018-05-05 | End: 2018-05-10

## 2018-05-05 RX ORDER — OXYCODONE HYDROCHLORIDE AND ACETAMINOPHEN 5; 325 MG/1; MG/1
1-2 TABLET ORAL EVERY 4 HOURS PRN
Qty: 15 TAB | Refills: 0 | Status: SHIPPED | OUTPATIENT
Start: 2018-05-05 | End: 2018-05-12

## 2018-05-05 RX ORDER — BUPIVACAINE HYDROCHLORIDE 2.5 MG/ML
20 INJECTION, SOLUTION EPIDURAL; INFILTRATION; INTRACAUDAL ONCE
Status: COMPLETED | OUTPATIENT
Start: 2018-05-05 | End: 2018-05-05

## 2018-05-05 RX ORDER — LIDOCAINE HYDROCHLORIDE 10 MG/ML
20 INJECTION, SOLUTION INFILTRATION; PERINEURAL ONCE
Status: COMPLETED | OUTPATIENT
Start: 2018-05-05 | End: 2018-05-05

## 2018-05-05 RX ADMIN — BUPIVACAINE HYDROCHLORIDE 20 ML: 2.5 INJECTION, SOLUTION EPIDURAL; INFILTRATION; INTRACAUDAL; PERINEURAL at 13:00

## 2018-05-05 RX ADMIN — CEFAZOLIN SODIUM 2 G: 2 INJECTION, SOLUTION INTRAVENOUS at 13:26

## 2018-05-05 RX ADMIN — LIDOCAINE HYDROCHLORIDE 20 ML: 10 INJECTION, SOLUTION INFILTRATION; PERINEURAL at 13:00

## 2018-05-05 NOTE — ED PROVIDER NOTES
"ED Provider Note    Scribed for Ari Araiza M.D. by Jorge Fields. 5/5/2018  12:47 PM    Primary Care Provider: Pcp Pt States None  Means of arrival: Walk in  History limited by: None    CHIEF COMPLAINT  Chief Complaint   Patient presents with   • T-5000 Lacerations     left pinky finger       HPI  Ki Alba is a 33 y.o. male who presents to the ED complaining of a left handed fifth finger laceration onset just prior to arrival. He has associated pain, active bleeding, and tingling and numbness to his left hand. The patient was working on a car engine when it dropped on his finger, approximately 30 pounds. His last tetanus shot was a couple years ago. Denies any other medical problems.    REVIEW OF SYSTEMS    SKIN:  Left fifth finger laceration with pain and active bleeding   NEUROLOGIC:  Tingling and numbness to left hand  E.     PAST MEDICAL HISTORY  Last tetanus September 2017    FAMILY HISTORY  No one else sick at this time    SOCIAL HISTORY   reports that he has been smoking.  He uses smokeless tobacco. He reports that he drinks alcohol. He reports that he uses drugs, including Inhaled.    SURGICAL HISTORY  Past Surgical History:   Procedure Laterality Date   • LACERATION REPAIR N/A 1/29/2017    Procedure: LACERATION REPAIR- complex repair upper lip ;  Surgeon: Omid Flowers M.D.;  Location: SURGERY [facility];  Service:        CURRENT MEDICATIONS  Home Medications     Reviewed by Sue Thomas R.N. (Registered Nurse) on 05/05/18 at 1242  Med List Status: Complete   Medication Last Dose Status   benzonatate (TESSALON) 100 MG Cap  Active   DM-Doxylamine-Acetaminophen (VICKS NYQUIL COLD & FLU) 15-6. MG/15ML Liquid 11/30/2017 Active   ibuprofen (MOTRIN) 800 MG Tab  Active                ALLERGIES  No Known Allergies    PHYSICAL EXAM  VITAL SIGNS: /102   Pulse (!) 109   Temp 37 °C (98.6 °F)   Resp 18   Ht 1.803 m (5' 11\")   Wt 90.7 kg (199 lb 15.3 oz)   SpO2 98%   BMI " 27.89 kg/m²      Constitutional: Patient is awake and alert. No acute respiratory distress.   Cardiovascular: Heart is regular rate and rhythm no murmur, rub or thrill.   Thorax & Lungs: Chest is symmetrical, with good breath sounds. No wheezing or crackles. No respiratory distress,   Skin: Left hand little finger laceration crush injury going through nail to both sides and shelter through the finger total   Warm, Dry, no petechia, purpura, or rash.   Extremities: Left hand little finger laceration crush injury going through nail to both sides and shelter through the finger total  Musculoskeletal: Cap refill 2+, Good range of motion to wrists, elbows, shoulders, hips, knees, and ankles. Pulses 2+ radially and femorally. No gross deformities noted.   Neurologic: Alert & oriented to person, time, and place.  Strength is 5 over 5 and symmetric in bilateral upper and lower extremities.  Sensory is intact to light touch to face, arms, and legs.  DTRs are symmetrical in biceps brachioradialis, patella and Achilles.   Psychiatric: Normal affect    PROCEDURES     Digital block 1% Marcaine 1% lidocaine with good results  Finger was irrigated with 1 L saline  I have removed the nail proximally and distally.  I closed the lacerations on each side of the nail bed with 5-0 nylon ×3.  I then placed 3 6-0 Vicryl in the nail bed itself.  I then placed Xeroform sterile gauze underneath the nail matrix and skin proximal. The wound was then covered with Xeroform gauze.  I then instructed the nurses to place a tube gauze and finger splint on the wound.  Patient placed into a sling.    Patient understands he has a high risk of infection to the bone to the skin. Scarring. Loss of nail or deformity to the nail.    COURSE & MEDICAL DECISION MAKING  Pertinent Labs & Imaging studies reviewed. (See chart for details)    Differential diagnoses include but are not limited to     12:47 PM - Patient seen and examined at bedside.  Patient will be  medicated with Ancef 2 g, bupivacaine 20 ml, and lidocaine 1% inj for his symptoms.     Decision Making  Patient is a crush injury to the left index finger very complex including the nailbed and distal tuft fracture. After discussion with Dr. Patton orthopedics hand surgery I have repaired this as above. The patient understands is a risk of was the tip of his finger infection scarring nail damage.    He'll be followed up by hand orthopedics through Dr. Patton's office      I reviewed the prescription monitoring program prescriptions. I have gone through the opiate risk questionnaire, I have also gone over with him the proper way to take care of the prescription he does not use them, the risk of addiction and overdose.      FINAL IMPRESSION  1. Open fracture left index finger with nail repair and laceration repair  2. Digital block      PLAN  1. Follow-up Dr. Patton call in 2 days  2. No use of left hand until cleared by hand surgery  3. Keflex and Percocet   4. Wound care dressings  5. Return to the emergency department for increased pains, fevers, vomiting or change in condition.     Jorge COLON (Darin), am scribing for, and in the presence of, Ari Araiza M.D..    Electronically signed by: Jorge Fields (Darin), 5/5/2018    Ari COLON M.D. personally performed the services described in this documentation, as scribed by Jorge Fields in my presence, and it is both accurate and complete.    The note accurately reflects work and decisions made by me.  Ari Araiza  5/5/2018  3:07 PM

## 2018-05-05 NOTE — ED NOTES
Pt ambulates to triage with c/c laceration to left pinky finger.  Pt was installing a car engine when the engine dropped on his finger.  A&ox4.  Bleeding controlled.  Appears in pain.  Pt to lobby & advised to inform RN of any changes.    Pt seen at Urgent Care prior to coming to ER.

## 2018-05-05 NOTE — DISCHARGE INSTRUCTIONS
Finger Fracture  You have a broken (fractured) finger. The finger may need to be straightened if the fracture is poorly aligned. Most of the time finger fractures will heal in 3 to 4 weeks with a simple finger splint. Taping the injured finger to the next finger (mohamud taping) can also be used to immobilize the injured finger. If the fracture is unstable, surgery may be needed and fixation pins placed to keep the broken bones properly aligned while healing.  Keep your hand raised (elevated) above your heart as much as possible. Apply ice packs for 20 to 30 minutes every 3 to 4 hours over the next 2 to 3 days. This will help reduce the swelling and pain. Wear your finger splint or mohamud tape as long as the finger is still painful or swollen. Pain medicine may be needed for the first few days. Your caregiver may order motion exercises to keep the finger from becoming too stiff. Perform motion exercises as directed.  See your caregiver for follow-up care as recommended.  SEEK MEDICAL CARE IF:   · You have increased pain or your finger becomes cold, numb, crooked, or pale.   · You are not getting better or seem to be getting worse.   · You have questions or concerns.   Document Released: 01/25/2006 Document Revised: 03/11/2013 Document Reviewed: 06/15/2010  ExitCare® Patient Information ©2013 [employer].  Fingernail or Toenail Loss  All or part of your fingernail or toenail has been lost. This may or may not grow back as a normal nail. A special non-stick bandage has been put on your finger or toe tightly to prevent bleeding.  HOME CARE INSTRUCTIONS   The tips of fingers and toes are full of nerves and injuries are often very painful. The following will help you decrease the pain and obtain the best outcome.  · Keep your hand or foot elevated above your heart to relieve pain and swelling. This will require lying in bed or on a couch with the hand or leg on pillows or sitting in a recliner with the leg up. Letting  your hand or leg dangle may increase swelling, slow healing and cause throbbing pain.  · Keep your dressing dry and clean.  · Change your bandage in 24 hours after going home.  · After your bandage is changed, soak your hand or foot in warm soapy water for 10 to 20 minutes. Do this 3 times per day. This helps reduce pain and swelling. After soaking, apply a clean, dry bandage. Change your bandage if it is wet or dirty.  · Only take over-the-counter or prescription medicines for pain, discomfort, or fever as directed by your caregiver.  · See your caregiver as needed for problems.  SEEK IMMEDIATE MEDICAL CARE IF:   · You have increased pain, swelling, drainage, or bleeding.  · You have a fever.  MAKE SURE YOU:   · Understand these instructions.  · Will watch your condition.  · Will get help right away if you are not doing well or get worse.  Document Released: 11/09/2007 Document Revised: 03/11/2013 Document Reviewed: 01/29/2008  [employer]® Patient Information ©2014 [employer].      Laceration Care, Adult  A laceration is a cut that goes through all of the layers of the skin and into the tissue that is right under the skin. Some lacerations heal on their own. Others need to be closed with stitches (sutures), staples, skin adhesive strips, or skin glue. Proper laceration care minimizes the risk of infection and helps the laceration to heal better.  HOW TO CARE FOR YOUR LACERATION  If sutures or staples were used:  · Keep the wound clean and dry.  · If you were given a bandage (dressing), you should change it at least one time per day or as told by your health care provider. You should also change it if it becomes wet or dirty.  · Keep the wound completely dry for the first 24 hours or as told by your health care provider. After that time, you may shower or bathe. However, make sure that the wound is not soaked in water until after the sutures or staples have been removed.  · Clean the wound one time each day or as  told by your health care provider:  ¨ Wash the wound with soap and water.  ¨ Rinse the wound with water to remove all soap.  ¨ Pat the wound dry with a clean towel. Do not rub the wound.  · After cleaning the wound, apply a thin layer of antibiotic ointment as told by your health care provider. This will help to prevent infection and keep the dressing from sticking to the wound.  · Have the sutures or staples removed as told by your health care provider.  If skin adhesive strips were used:  · Keep the wound clean and dry.  · If you were given a bandage (dressing), you should change it at least one time per day or as told by your health care provider. You should also change it if it becomes dirty or wet.  · Do not get the skin adhesive strips wet. You may shower or bathe, but be careful to keep the wound dry.  · If the wound gets wet, pat it dry with a clean towel. Do not rub the wound.  · Skin adhesive strips fall off on their own. You may trim the strips as the wound heals. Do not remove skin adhesive strips that are still stuck to the wound. They will fall off in time.  If skin glue was used:  · Try to keep the wound dry, but you may briefly wet it in the shower or bath. Do not soak the wound in water, such as by swimming.  · After you have showered or bathed, gently pat the wound dry with a clean towel. Do not rub the wound.  · Do not do any activities that will make you sweat heavily until the skin glue has fallen off on its own.  · Do not apply liquid, cream, or ointment medicine to the wound while the skin glue is in place. Using those may loosen the film before the wound has healed.  · If you were given a bandage (dressing), you should change it at least one time per day or as told by your health care provider. You should also change it if it becomes dirty or wet.  · If a dressing is placed over the wound, be careful not to apply tape directly over the skin glue. Doing that may cause the glue to be pulled off  before the wound has healed.  · Do not pick at the glue. The skin glue usually remains in place for 5-10 days, then it falls off of the skin.  General Instructions  · Take over-the-counter and prescription medicines only as told by your health care provider.  · If you were prescribed an antibiotic medicine or ointment, take or apply it as told by your doctor. Do not stop using it even if your condition improves.  · To help prevent scarring, make sure to cover your wound with sunscreen whenever you are outside after stitches are removed, after adhesive strips are removed, or when glue remains in place and the wound is healed. Make sure to wear a sunscreen of at least 30 SPF.  · Do not scratch or pick at the wound.  · Keep all follow-up visits as told by your health care provider. This is important.  · Check your wound every day for signs of infection. Watch for:  ¨ Redness, swelling, or pain.  ¨ Fluid, blood, or pus.  · Raise (elevate) the injured area above the level of your heart while you are sitting or lying down, if possible.  SEEK MEDICAL CARE IF:  · You received a tetanus shot and you have swelling, severe pain, redness, or bleeding at the injection site.  · You have a fever.  · A wound that was closed breaks open.  · You notice a bad smell coming from your wound or your dressing.  · You notice something coming out of the wound, such as wood or glass.  · Your pain is not controlled with medicine.  · You have increased redness, swelling, or pain at the site of your wound.  · You have fluid, blood, or pus coming from your wound.  · You notice a change in the color of your skin near your wound.  · You need to change the dressing frequently due to fluid, blood, or pus draining from the wound.  · You develop a new rash.  · You develop numbness around the wound.  SEEK IMMEDIATE MEDICAL CARE IF:  · You develop severe swelling around the wound.  · Your pain suddenly increases and is severe.  · You develop painful  lumps near the wound or on skin that is anywhere on your body.  · You have a red streak going away from your wound.  · The wound is on your hand or foot and you cannot properly move a finger or toe.  · The wound is on your hand or foot and you notice that your fingers or toes look pale or bluish.     This information is not intended to replace advice given to you by your health care provider. Make sure you discuss any questions you have with your health care provider.     Document Released: 12/18/2006 Document Revised: 05/03/2016 Document Reviewed: 12/14/2015  Elsevier Interactive Patient Education ©2016 [employer].            SUTURES OUT & Days

## 2018-08-28 ENCOUNTER — OFFICE VISIT (OUTPATIENT)
Dept: URGENT CARE | Facility: PHYSICIAN GROUP | Age: 33
End: 2018-08-28
Payer: COMMERCIAL

## 2018-08-28 VITALS
HEART RATE: 97 BPM | WEIGHT: 203 LBS | HEIGHT: 71 IN | OXYGEN SATURATION: 98 % | SYSTOLIC BLOOD PRESSURE: 128 MMHG | BODY MASS INDEX: 28.42 KG/M2 | TEMPERATURE: 98.7 F | DIASTOLIC BLOOD PRESSURE: 90 MMHG

## 2018-08-28 DIAGNOSIS — H65.113 ACUTE ALLERGIC OTITIS MEDIA OF BOTH EARS, RECURRENCE NOT SPECIFIED: ICD-10-CM

## 2018-08-28 PROCEDURE — 99213 OFFICE O/P EST LOW 20 MIN: CPT | Performed by: FAMILY MEDICINE

## 2018-08-28 RX ORDER — FLUTICASONE PROPIONATE 50 MCG
1 SPRAY, SUSPENSION (ML) NASAL DAILY
Qty: 16 G | Refills: 0 | Status: SHIPPED | OUTPATIENT
Start: 2018-08-28

## 2018-08-28 RX ORDER — CETIRIZINE HYDROCHLORIDE 10 MG/1
10 TABLET ORAL DAILY
Qty: 30 TAB | Refills: 0 | Status: SHIPPED | OUTPATIENT
Start: 2018-08-28

## 2018-08-31 ASSESSMENT — ENCOUNTER SYMPTOMS: RHINORRHEA: 1

## 2018-09-01 NOTE — PROGRESS NOTES
"Subjective:   Ki Alba is a 33 y.o. male who presents for Otalgia (bilat ears onset friday, sinus pain, drainage)        Otalgia    There is pain in both ears. This is a new problem. The current episode started in the past 7 days. The problem occurs constantly. The problem has been gradually worsening. The pain is mild. Associated symptoms include rhinorrhea. Pertinent negatives include no ear discharge or hearing loss.     Review of Systems   HENT: Positive for ear pain and rhinorrhea. Negative for ear discharge and hearing loss.      No Known Allergies   Objective:   /90   Pulse 97   Temp 37.1 °C (98.7 °F)   Ht 1.803 m (5' 11\")   Wt 92.1 kg (203 lb)   SpO2 98%   BMI 28.31 kg/m²   Physical Exam   Constitutional: He is oriented to person, place, and time. He appears well-developed and well-nourished. No distress.   HENT:   Head: Normocephalic and atraumatic.   Right Ear: Hearing, external ear and ear canal normal. A middle ear effusion is present.   Left Ear: Hearing, external ear and ear canal normal. A middle ear effusion is present.   Eyes: Pupils are equal, round, and reactive to light. Conjunctivae and EOM are normal.   Cardiovascular: Normal rate and regular rhythm.    No murmur heard.  Pulmonary/Chest: Effort normal and breath sounds normal. No respiratory distress.   Abdominal: Soft. He exhibits no distension. There is no tenderness.   Neurological: He is alert and oriented to person, place, and time. He has normal reflexes. No sensory deficit.   Skin: Skin is warm and dry.   Psychiatric: He has a normal mood and affect.         Assessment/Plan:   Assessment    1. Acute allergic otitis media of both ears, recurrence not specified  - cetirizine (ZYRTEC ALLERGY) 10 MG Tab; Take 1 Tab by mouth every day.  Dispense: 30 Tab; Refill: 0  - fluticasone (FLONASE) 50 MCG/ACT nasal spray; Spray 1 Spray in nose every day.  Dispense: 16 g; Refill: 0    Differential diagnosis, natural history, " supportive care, and indications for immediate follow-up discussed.

## 2020-06-22 ENCOUNTER — HOSPITAL ENCOUNTER (EMERGENCY)
Facility: MEDICAL CENTER | Age: 35
End: 2020-06-22
Attending: EMERGENCY MEDICINE
Payer: COMMERCIAL

## 2020-06-22 VITALS
BODY MASS INDEX: 27.53 KG/M2 | HEIGHT: 72 IN | OXYGEN SATURATION: 96 % | HEART RATE: 68 BPM | DIASTOLIC BLOOD PRESSURE: 95 MMHG | TEMPERATURE: 96.6 F | RESPIRATION RATE: 11 BRPM | SYSTOLIC BLOOD PRESSURE: 155 MMHG

## 2020-06-22 DIAGNOSIS — J02.0 STREP PHARYNGITIS: ICD-10-CM

## 2020-06-22 LAB — S PYO DNA SPEC NAA+PROBE: DETECTED

## 2020-06-22 PROCEDURE — A9270 NON-COVERED ITEM OR SERVICE: HCPCS | Performed by: EMERGENCY MEDICINE

## 2020-06-22 PROCEDURE — 700102 HCHG RX REV CODE 250 W/ 637 OVERRIDE(OP): Performed by: EMERGENCY MEDICINE

## 2020-06-22 PROCEDURE — 99283 EMERGENCY DEPT VISIT LOW MDM: CPT

## 2020-06-22 PROCEDURE — 87651 STREP A DNA AMP PROBE: CPT

## 2020-06-22 RX ORDER — AMOXICILLIN 500 MG/1
1000 CAPSULE ORAL ONCE
Status: COMPLETED | OUTPATIENT
Start: 2020-06-22 | End: 2020-06-22

## 2020-06-22 RX ORDER — AMOXICILLIN 500 MG/1
1000 CAPSULE ORAL DAILY
Qty: 20 CAP | Refills: 0 | Status: SHIPPED | OUTPATIENT
Start: 2020-06-22 | End: 2020-07-02

## 2020-06-22 RX ADMIN — AMOXICILLIN 1000 MG: 500 CAPSULE ORAL at 12:28

## 2020-06-22 NOTE — ED TRIAGE NOTES
Pt ambualted to G39. Pt changed into gown and placed on monitor.  Agree with triage note.   Chart up and ready for ERP now.

## 2020-06-22 NOTE — ED PROVIDER NOTES
"ED Provider Note      CHIEF COMPLAINT  Chief Complaint   Patient presents with   • Sore Throat       HPI  Ki Alba is a 35 y.o. male who presents with a sore throat.  Started 3 days ago.  Fever yesterday.  No fever since.  Throat hurts all over.  Burning scratching worse with swallowing.  No difficulty breathing.  No pain worse on one side or another.  No change in voice.  He has had just the very mildest runny nose and an occasional cough, but nothing terribly abnormal.  Both of his children are sick at home with URI symptoms.  No other specific known ill contacts.  Denies headache, change in taste or smell, myalgias, shortness of breath, abdominal pain, nausea, vomiting diarrhea.    REVIEW OF SYSTEMS  See HPI    PAST MEDICAL HISTORY  No past medical history on file.    FAMILY HISTORY  No family history on file.    SOCIAL HISTORY  Social History     Tobacco Use   • Smoking status: Current Every Day Smoker     Types: Cigarettes   • Smokeless tobacco: Current User     Types: Chew   Substance Use Topics   • Alcohol use: Yes     Comment: Daily   • Drug use: Yes     Types: Inhaled     Comment: \"Pot daily\"       SURGICAL HISTORY  Past Surgical History:   Procedure Laterality Date   • LACERATION REPAIR N/A 1/29/2017    Procedure: LACERATION REPAIR- complex repair upper lip ;  Surgeon: Omid Flowers M.D.;  Location: SURGERY [facility];  Service:        CURRENT MEDICATIONS  Home Medications    **Home medications have not yet been reviewed for this encounter**         ALLERGIES  No Known Allergies    PHYSICAL EXAM  VITAL SIGNS: BP (!) 166/110   Pulse 82   Temp 35.9 °C (96.6 °F) (Temporal)   Resp 16   Ht 1.829 m (6')   SpO2 98%   BMI 27.53 kg/m²   Constitutional :  No acute distress, Non-toxic appearance.   HENT: Head atraumatic, diffuse pharyngeal erythema.  No exudates or asymmetry.  No abscess.  Eyes: Normal inspection, no discharge, no injection  Neck: Normal range of motion, No tenderness, " Supple, No stridor.   Lymphatic: no cervical lymphadenopathy.   Cardiovascular: Normal heart rate, Normal rhythm, No murmurs   Thorax & Lungs: Lungs are clear to auscultation bilaterally without wheezes, rales, rhonchi  Skin: Warm, Dry, No erythema, No rash.   Extremities: No edema, No tenderness, No cyanosis, No clubbing.     LABS:  Results for orders placed or performed during the hospital encounter of 06/22/20   Group A Strep by PCR    Specimen: Throat   Result Value Ref Range    Group A Strep by PCR DETECTED (A) Not Detected        COURSE & MEDICAL DECISION MAKING  Patient presents with a sore throat.  Vital signs are stable.  No suggestion of abscess or airway compromise.  Obtain strep screen.  This is positive.  Given first dose of amoxicillin in the ER and a 10-day course per protocol.  Advised return to the ER for worsening symptoms, not improving or concern.    FINAL IMPRESSION  1.  Streptococcal pharyngitis    This dictation was created using voice recognition software. The accuracy of the dictation is limited to the abilities of the software.   the nursing notes were reviewed and certain aspects of this information were incorporated into this note.      Electronically signed by: Misael Alves M.D., 6/22/2020

## 2022-04-07 ENCOUNTER — OFFICE VISIT (OUTPATIENT)
Dept: URGENT CARE | Facility: PHYSICIAN GROUP | Age: 37
End: 2022-04-07
Payer: COMMERCIAL

## 2022-04-07 ENCOUNTER — HOSPITAL ENCOUNTER (OUTPATIENT)
Dept: RADIOLOGY | Facility: MEDICAL CENTER | Age: 37
End: 2022-04-07
Attending: EMERGENCY MEDICINE
Payer: COMMERCIAL

## 2022-04-07 VITALS
RESPIRATION RATE: 12 BRPM | HEIGHT: 72 IN | OXYGEN SATURATION: 99 % | BODY MASS INDEX: 25.73 KG/M2 | DIASTOLIC BLOOD PRESSURE: 80 MMHG | TEMPERATURE: 97.5 F | SYSTOLIC BLOOD PRESSURE: 126 MMHG | HEART RATE: 92 BPM | WEIGHT: 190 LBS

## 2022-04-07 DIAGNOSIS — M25.522 LEFT ELBOW PAIN: ICD-10-CM

## 2022-04-07 PROCEDURE — 73080 X-RAY EXAM OF ELBOW: CPT | Mod: LT

## 2022-04-07 PROCEDURE — 99213 OFFICE O/P EST LOW 20 MIN: CPT | Performed by: EMERGENCY MEDICINE

## 2022-04-07 RX ORDER — MELOXICAM 7.5 MG/1
7.5 TABLET ORAL
Qty: 15 TABLET | Refills: 0 | Status: SHIPPED | OUTPATIENT
Start: 2022-04-07

## 2022-04-07 NOTE — PROGRESS NOTES
Subjective:     Ki Alba  is a 37 y.o. male who presents for Elbow Pain (X1 month left elbow pain, feels like tore a muscle )       Patient is a 39-year-old male who works as a .  He reports he has had left elbow pain for about a month.  He stated it is not a Workmen's Comp. injury, because it is reactivation of a high school wrestling injury.  He states in high school he hyperextended his left elbow and had to go through extensive physical therapy and was left with good though still somewhat limited range of motion of the left elbow.  He states it flares up annually usually resolves on its own.  He states about a month ago he had a 20 pound hammer in his right hand and was knocking a pen out of something.  As he twisted around with a hammer to strike the pin, he states he did something to twist his elbow he felt an immediate severe burn in the elbow.  Patient also reports he has noted some sensory changes in his third fourth and fifth fingers of the left hand.  He is right-hand dominant.  He has been taking ibuprofen IcyHot and CBD oil with out significant relief.  He has been icing it down and wearing a splint as well.  He reports pain is worse with pronation and supination.  He denies any other injuries.  Is a smoker.  Review of his past medical history shows no recent visits for elbow complaints.  He does not follow routinely with primary care.   Review of Systems   Musculoskeletal: Positive for joint pain (elbow left).   All other systems reviewed and are negative.   No Known Allergies  History reviewed. No pertinent past medical history.     Objective:   /80 (BP Location: Right arm, Patient Position: Sitting, BP Cuff Size: Adult)   Pulse 92   Temp 36.4 °C (97.5 °F) (Temporal)   Resp 12   Ht 1.829 m (6')   Wt 86.2 kg (190 lb)   SpO2 99%   BMI 25.77 kg/m²   Physical Exam  Vitals and nursing note reviewed.   Constitutional:       Appearance: Normal appearance. He is not  ill-appearing, toxic-appearing or diaphoretic.   HENT:      Head: Normocephalic and atraumatic.      Nose: No rhinorrhea.   Eyes:      General: No scleral icterus.  Pulmonary:      Effort: Pulmonary effort is normal. No respiratory distress.   Musculoskeletal:         General: Swelling and tenderness present. No deformity.      Comments: Some swelling noted over the lateral aspect of the elbow joint.  No erythema is noted.  Point tenderness over the lateral condyle of the humerus, As well as medially over the proximal radius in the area of the biceps tendon insertion.  He has no tenderness on palpation of the remainder of the radius or ulna.  He does note some change in sensation in the third fourth and fifth fingers of the left hand has been ongoing for about a month.   Skin:     General: Skin is warm.      Findings: No rash.   Neurological:      Mental Status: He is alert and oriented to person, place, and time.   Psychiatric:         Mood and Affect: Mood normal.         Behavior: Behavior normal.         Thought Content: Thought content normal.           Assessment/Plan:     Encounter Diagnoses   Name Primary?   • Left elbow pain      Personally reviewed the x-ray films.  Formal reading by radiology:IMPRESSION:   1.  No fracture or dislocation of LEFT elbow. 2.  Probable joint effusion present. 3.  Apparent joint bodies present, with subtle lesion in the capitellum concerning for osteochondral injury, likely chronic.  Viewed radiology findings as well as films with patient and answered questions    See AVS Instructions below for written guidance provided to patient on after-visit management and care in addition to our verbal discussion during the visit.    Discussed need for routine followup care with primary physician.    Please note that this dictation was created using voice recognition software. I have attempted to correct all errors, but there may be sound-alike, spelling, grammar and possibly content  errors that I did not discover before finalizing the note.

## 2022-04-07 NOTE — LETTER
[facility] [address] [address]     April 7, 2022    Patient: Ki Alba   YOB: 1985   Date of Visit: 4/7/2022       To Whom It May Concern:    Ki Alba was seen and treated in our department on 4/7/2022.  He may return to work effective for 4/8/22, but is on limited use of his left arm.  He must wear a splint, should not lift anything greater than 2 pounds with his left arm, and should not be doing any pushing, pulling or similar activities that would stress his left elbow joint.  His limitations are in effect until 4/15/2022, and further limitations will be dictated by sports medicine follow-up.    Sincerely,     Radha Jimenez M.D.

## 2022-04-07 NOTE — PATIENT INSTRUCTIONS
Been prescribed meloxicam for pain.  Do not take ibuprofen or naproxen while taking meloxicam.    Continue with rest ice and compression as you have been.  Wear the splint for support.  You are getting a separate work note with limitations for use of your left arm.    You are being referred urgently for sports medicine follow-up for evaluation, treatment.  We will order further imaging or referrals if they feel that is indicated.    Follow up with your primary care physician if not improved in 3-5 days.  Continue taking your other medications as currently prescribed.    See below for further / more detailed instructions.    Elbow Injury  You or your child has an elbow injury. X-rays and exam today do not show evidence of a fracture (broken bone). That means that only a sling or splint may be required for a brief period of time as directed by your caregiver.  HOME CARE INSTRUCTIONS  · Only take over-the-counter or prescription medicines for pain, discomfort, or fever as directed by your caregiver.   · If you have a splint held on with an elastic wrap or a cast, watch your hand or fingers. If they become numb or cold and blue, loosen the wrap and reapply more loosely. See your caregiver if there is no relief.   · You may use ice on your elbow for 15-20 minutes, 3-4 times per day, for the first 2 to 3 days.   · Use your elbow as directed.   · See your caregiver as directed. It is very important to keep all follow-up referrals and appointments in order to avoid any long-term problems with your elbow including chronic pain or inability to move the elbow normally.   SEEK IMMEDIATE MEDICAL CARE IF:   · There is swelling or increasing pain in your elbow which is not relieved with medications.   · You begin to lose feeling in your hand or fingers, or develop swelling of the hand and fingers.   · You get a cold or blue hand or fingers on injured side.   · If your elbow remains sore, your caregiver may want to x-ray it again.  A hairline fracture may not show up on the first x-rays and may only be seen on repeat x-rays ten days to two weeks later. A specialist (radiologist) may examine your x-rays at a later time. In order to get results from the radiologist or their department, make sure you know how and when you are to get that information. It is your responsibility to get results of any tests you may have had.   MAKE SURE YOU:   · Understand these instructions.   · Will watch your condition.   · Will get help right away if you are not doing well or get worse.   Document Released: 03/09/2005 Document Revised: 03/11/2013 Document Reviewed: 08/05/2009  [employer]® Patient Information ©2013 [employer].

## 2022-04-18 ENCOUNTER — OFFICE VISIT (OUTPATIENT)
Dept: SPORTS MEDICINE | Facility: CLINIC | Age: 37
End: 2022-04-18
Payer: COMMERCIAL

## 2022-04-18 VITALS
TEMPERATURE: 98.1 F | HEIGHT: 72 IN | DIASTOLIC BLOOD PRESSURE: 76 MMHG | OXYGEN SATURATION: 97 % | BODY MASS INDEX: 25.73 KG/M2 | HEART RATE: 82 BPM | WEIGHT: 190 LBS | RESPIRATION RATE: 16 BRPM | SYSTOLIC BLOOD PRESSURE: 122 MMHG

## 2022-04-18 DIAGNOSIS — S59.802A HYPEREXTENSION INJURY OF LEFT ELBOW, INITIAL ENCOUNTER: ICD-10-CM

## 2022-04-18 DIAGNOSIS — M25.522 CHRONIC PAIN OF LEFT ELBOW: ICD-10-CM

## 2022-04-18 DIAGNOSIS — R29.898 WEAKNESS OF LEFT ARM: ICD-10-CM

## 2022-04-18 DIAGNOSIS — G89.29 CHRONIC PAIN OF LEFT ELBOW: ICD-10-CM

## 2022-04-18 PROCEDURE — 99204 OFFICE O/P NEW MOD 45 MIN: CPT | Performed by: FAMILY MEDICINE

## 2022-04-18 ASSESSMENT — ENCOUNTER SYMPTOMS
DIZZINESS: 0
FEVER: 0
NAUSEA: 0
CHILLS: 0
SHORTNESS OF BREATH: 0
VOMITING: 0

## 2022-04-18 NOTE — PROGRESS NOTES
Chief Complaint   Patient presents with   • Elbow Pain     Referral from / L elbow pain        Subjective     Referred by Radha Jimenez M.D. for evaluation of left elbow pain (right-hand-dominant)  Date of injury, April 6, 2022  Since his injury approximately 20 years ago he, he has not been able to fully extend the elbow  Mechanism injury, using a sledgehammer and missed his target causing a forced hyperextension of the LEFT elbow  Sudden sharp warm and tearing sensation in the LEFT elbow along the entire  Achy pain  elbow joint, medial biceps region and lateral forearm region  Worse with pronation, activity and extreme extension as well as gripping  No radiation  Improved with holding elbow at 90 degrees in a slinged position  Does have a prior injury approximately 20 years ago at age 17, hyperextension during a fall while wrestling  Back then he had been immobilized for approximately 8 months and underwent extensive physical therapy during his recovery    Experiencing pain in the RIGHT shoulder which is likely secondary to increased use of the arm/compensation  Worse with abducting shoulder and pronating    Seen in urgent care  Had x-rays  Provided with work limitations    , difficulty performing his duties secondary to pain  Likes fishing and outdoor activities    Review of Systems   Constitutional: Negative for chills and fever.   Respiratory: Negative for shortness of breath.    Cardiovascular: Negative for chest pain.   Gastrointestinal: Negative for nausea and vomiting.   Neurological: Negative for dizziness.     PMH:  has no past medical history on file.  MEDS:   Current Outpatient Medications:   •  meloxicam (MOBIC) 7.5 MG Tab, Take 1 Tablet by mouth 1 time a day as needed for Mild Pain or Moderate Pain., Disp: 15 Tablet, Rfl: 0  •  cetirizine (ZYRTEC ALLERGY) 10 MG Tab, Take 1 Tab by mouth every day. (Patient not taking: Reported on 4/7/2022), Disp: 30 Tab, Rfl: 0  •   fluticasone (FLONASE) 50 MCG/ACT nasal spray, Spray 1 Spray in nose every day. (Patient not taking: Reported on 4/7/2022), Disp: 16 g, Rfl: 0  •  benzonatate (TESSALON) 100 MG Cap, Take 1 Cap by mouth 3 times a day as needed for Cough. (Patient not taking: Reported on 4/7/2022), Disp: 60 Cap, Rfl: 0  •  DM-Doxylamine-Acetaminophen 15-6. MG/15ML Liquid, Take 1 Dose by mouth every 6 hours as needed. (Patient not taking: Reported on 4/7/2022), Disp: , Rfl:   ALLERGIES: No Known Allergies  SURGHX:   Past Surgical History:   Procedure Laterality Date   • LACERATION REPAIR N/A 1/29/2017    Procedure: LACERATION REPAIR- complex repair upper lip ;  Surgeon: Omid Flowers M.D.;  Location: SURGERY [facility];  Service:      SOCHX:  reports that he has been smoking cigarettes. His smokeless tobacco use includes chew. He reports current alcohol use. He reports current drug use. Drug: Inhaled.  FH: Family history was reviewed, no pertinent findings to report    Objective   /76 (BP Location: Right arm, Patient Position: Sitting, BP Cuff Size: Adult)   Pulse 82   Temp 36.7 °C (98.1 °F) (Temporal)   Resp 16   Ht 1.829 m (6')   Wt 86.2 kg (190 lb)   SpO2 97%   BMI 25.77 kg/m²     No acute distress  Alert and oriented ×3    BILATERAL Shoulder exam:  Range of motion INTACT  Strength testing NORMAL with empty can, internal rotation, external rotation and lift off testing  NO tenderness of the supraspinatus, infraspinatus or biceps tendon  Apprehension testing NORMAL    BILATERAL ELBOW exam  Range of motion Decreased with flexion extension on the LEFT compared to right  No swelling  No tenderness of the medial or lateral epicondyle  Resisted elbow flexion with supination is WEAK and PAINFUL on the LEFT compared to right  POSITIVE bony tenderness along the olecranon on the LEFT compared to right  Resisted finger extension test is comes positive on the LEFT compared to right    1. Chronic pain of left elbow   MR-ELBOW-W/O LEFT   2. Hyperextension injury of left elbow, initial encounter  MR-ELBOW-W/O LEFT   3. Weakness of left arm  MR-ELBOW-W/O LEFT     Date of injury, April 6, 2022 while swinging a hammer  Patient had already been experiencing pain prior to that for at least 2 weeks  Since his injury approximately 20 years ago he, he has not been able to fully extend the elbow    Acute on chronic elbow pain  Question of cortical irregularity of the olecranon together with tenderness at the olecranon NOT mentioned on the official report  Weakness and pain with resisted supination with elbow flexion suggesting possible distal biceps tendon tear    Check MRI of the LEFT elbow    Return in about 1 week (around 4/25/2022).   To discuss MRI results and further management options              4/7/2022 8:45 AM     HISTORY/REASON FOR EXAM:  Pain/Deformity Following Trauma.  Chronic LEFT elbow pain.  Remote history of trauma.     TECHNIQUE/EXAM DESCRIPTION AND NUMBER OF VIEWS:  3 views of the LEFT elbow.     COMPARISON: None     FINDINGS:  6 no focal soft tissue swelling.  Displaced anterior fat pad.  Calcific densities project over the joint space and anterior to distal humerus.  Faint lucency within the capitellum  No acute fracture or dislocation.     IMPRESSION:     1.  No fracture or dislocation of LEFT elbow.  2.  Probable joint effusion present.  3.  Apparent joint bodies present, with subtle lesion in the capitellum concerning for osteochondral injury, likely chronic.             Exam Ended: 04/07/22  8:59 AM Last Resulted: 04/07/22  9:05 AM              Interpreted in the office today with the patient    Thank you Radha Jimenez M.D. for allowing me to participate in caring for your patient.

## 2022-04-21 ENCOUNTER — APPOINTMENT (OUTPATIENT)
Dept: RADIOLOGY | Facility: MEDICAL CENTER | Age: 37
End: 2022-04-21
Attending: FAMILY MEDICINE
Payer: COMMERCIAL

## 2022-04-21 DIAGNOSIS — S59.802A HYPEREXTENSION INJURY OF LEFT ELBOW, INITIAL ENCOUNTER: ICD-10-CM

## 2022-04-21 DIAGNOSIS — R29.898 WEAKNESS OF LEFT ARM: ICD-10-CM

## 2022-04-21 DIAGNOSIS — M25.522 CHRONIC PAIN OF LEFT ELBOW: ICD-10-CM

## 2022-04-21 DIAGNOSIS — G89.29 CHRONIC PAIN OF LEFT ELBOW: ICD-10-CM

## 2022-04-21 PROCEDURE — 73221 MRI JOINT UPR EXTREM W/O DYE: CPT | Mod: LT

## 2022-04-26 ENCOUNTER — OFFICE VISIT (OUTPATIENT)
Dept: SPORTS MEDICINE | Facility: CLINIC | Age: 37
End: 2022-04-26
Payer: COMMERCIAL

## 2022-04-26 VITALS — WEIGHT: 190 LBS | HEIGHT: 72 IN | BODY MASS INDEX: 25.73 KG/M2

## 2022-04-26 DIAGNOSIS — M19.022: ICD-10-CM

## 2022-04-26 PROCEDURE — 99213 OFFICE O/P EST LOW 20 MIN: CPT | Performed by: FAMILY MEDICINE

## 2022-04-26 NOTE — PROGRESS NOTES
Chief Complaint   Patient presents with   • Elbow Pain     F/V MRI results      Date of injury, April 6, 2022  Since his injury approximately 20 years ago he, he has not been able to fully extend the elbow  Mechanism injury, using a sledgehammer and missed his target causing a forced hyperextension of the LEFT elbow    Objective     Ht 1.829 m (6')   Wt 86.2 kg (190 lb)   BMI 25.77 kg/m²         1. Osteoarthritis, localized, elbow, left  Referral to Physical Therapy     Date of injury, April 6, 2022  Since his injury approximately 20 years ago he, he has not been able to fully extend the elbow    At this point is approximately 40% improved from his initial injury, almost MCFP back to baseline      Return in about 2 weeks (around 5/10/2022).  Start PT estes  2 wks consider LEFT elbow intra-articular corticosteroid if persisting    If we decide to proceed with intra-articular LEFT elbow injection we will perform a standard (5/1/22) injection                4/21/2022 5:19 PM     HISTORY/REASON FOR EXAM:  Elbow pain, chronic, osteochondral injury, nondiagnostic xray  Recent hyperextension injury     TECHNIQUE/EXAM DESCRIPTION:  MRI of the LEFT elbow without contrast.     The study was performed on a Siemens Skyra 3.0 Marsha MRI scanner. T1 axial, T2 sagittal, T1 coronal, and T2 fat-suppressed axial, coronal, and sagittal sequences were obtained of the elbow.     COMPARISON: Left elbow x-ray 4/7/2022     FINDINGS:     There is a moderate-sized elbow joint effusion.        BONES, BONE MARROW, CARTILAGE:  There is osteoarthritis of the elbow joint with chondral loss. Subchondral edema is present within the radial head and the capitellum. There is also subchondral edema within the olecranon.     There are numerous intra-articular ossific body. Largest is located anteriorly measuring 8 mm.     TENDONS:  The biceps tendon common flexor tendon, and common extensor tendon appear normal.     Triceps tendon is abnormal.  Distally is attenuated consistent with degeneration or strain. There is no full-thickness tear. Uterine     LIGAMENTS:The radial collateral and ulnar collateral ligament appear normal.     MISCELLANEOUS: There is a joint effusion containing multiple intra-articular ossific body.     IMPRESSION:     1.  Severe osteoarthritis with associated areas of subchondral edema, chondral loss, and multiple intra-articular ossific body     2.  Strain or tendinopathy of the distal triceps tendon             Exam Ended: 04/21/22  6:12 PM Last Resulted: 04/22/22  9:44 AM           Interpreted in the office today with the patient

## 2023-01-04 ENCOUNTER — OFFICE VISIT (OUTPATIENT)
Dept: URGENT CARE | Facility: CLINIC | Age: 38
End: 2023-01-04
Payer: COMMERCIAL

## 2023-01-04 VITALS
HEIGHT: 72 IN | WEIGHT: 200 LBS | DIASTOLIC BLOOD PRESSURE: 80 MMHG | OXYGEN SATURATION: 98 % | RESPIRATION RATE: 16 BRPM | BODY MASS INDEX: 27.09 KG/M2 | HEART RATE: 87 BPM | TEMPERATURE: 98 F | SYSTOLIC BLOOD PRESSURE: 120 MMHG

## 2023-01-04 DIAGNOSIS — J02.0 STREP PHARYNGITIS: ICD-10-CM

## 2023-01-04 DIAGNOSIS — R51.9 NONINTRACTABLE EPISODIC HEADACHE, UNSPECIFIED HEADACHE TYPE: ICD-10-CM

## 2023-01-04 LAB
INT CON NEG: ABNORMAL
INT CON POS: ABNORMAL
S PYO AG THROAT QL: POSITIVE

## 2023-01-04 PROCEDURE — 99214 OFFICE O/P EST MOD 30 MIN: CPT | Performed by: STUDENT IN AN ORGANIZED HEALTH CARE EDUCATION/TRAINING PROGRAM

## 2023-01-04 PROCEDURE — 87880 STREP A ASSAY W/OPTIC: CPT | Performed by: STUDENT IN AN ORGANIZED HEALTH CARE EDUCATION/TRAINING PROGRAM

## 2023-01-04 RX ORDER — PENICILLIN V POTASSIUM 500 MG/1
500 TABLET ORAL 2 TIMES DAILY
Qty: 20 TABLET | Refills: 0 | Status: SHIPPED | OUTPATIENT
Start: 2023-01-04 | End: 2023-01-14

## 2023-01-04 NOTE — PROGRESS NOTES
"Subjective:   CHIEF COMPLAINT  Chief Complaint   Patient presents with    Pharyngitis     Sore throat x4 days States wife was pos. for strep.       HPI  Ki Alba is a 37 y.o. male who presents with a chief complaint of sore throat and headache for 6 days.  Also reports he is experiencing sinus congestion and cough.  Says his wife was seen in urgent care last week diagnosed with strep throat.  Patient is not taking any medication for symptomatic relief.  He has not had any fevers.    REVIEW OF SYSTEMS  General: no fever or chills  GI: no nausea or vomiting  See HPI for further details.    PAST MEDICAL HISTORY  Patient Active Problem List    Diagnosis Date Noted    Laceration of upper lip, complicated 01/29/2017       SURGICAL HISTORY   has a past surgical history that includes laceration repair (N/A, 1/29/2017).    ALLERGIES  No Known Allergies    CURRENT MEDICATIONS  Home Medications       Reviewed by Carroll Islas D.O. (Physician) on 01/04/23 at 0952  Med List Status: <None>     Medication Last Dose Status   benzonatate (TESSALON) 100 MG Cap  Active   cetirizine (ZYRTEC ALLERGY) 10 MG Tab  Active   DM-Doxylamine-Acetaminophen 15-6. MG/15ML Liquid  Active   fluticasone (FLONASE) 50 MCG/ACT nasal spray  Active   meloxicam (MOBIC) 7.5 MG Tab Not Taking Active                    SOCIAL HISTORY  Social History     Tobacco Use    Smoking status: Every Day     Types: Cigarettes    Smokeless tobacco: Current     Types: Chew   Substance and Sexual Activity    Alcohol use: Yes     Comment: Daily    Drug use: Yes     Types: Inhaled     Comment: \"Pot daily\"    Sexual activity: Not on file       FAMILY HISTORY  No family history on file.       Objective:   PHYSICAL EXAM  VITAL SIGNS: /80   Pulse 87   Temp 36.7 °C (98 °F) (Temporal)   Resp 16   Ht 1.829 m (6')   Wt 90.7 kg (200 lb)   SpO2 98%   BMI 27.12 kg/m²     Gen: no acute distress, normal voice  Skin: dry, intact, moist mucosal membranes  Eyes: " No conjunctival injection b/l  Neck: Normal range of motion. No meningeal signs.   ENT: Diffuse oral pharyngeal erythema with exudates along the right side of the uvula.  Uvula midline.  Lungs: CTAB w/ symmetric expansion  CV: RRR w/o murmurs or clicks  Psych: normal affect, normal judgement, alert, awake    POC strep: Positive    Assessment/Plan:     1. Strep pharyngitis  penicillin v potassium (VEETID) 500 MG Tab    POCT Rapid Strep A      2. Nonintractable episodic headache, unspecified headache type  Referral back to [facility] PCP      1) history and physical exam consistent with group A strep pharyngitis.  Wife diagnosed last week.  - Ordered Rx for penicillin V bid x10 days.  Printed prescription was provided  - Recommend ibuprofen and Tylenol as needed for symptomatic relief  - Return to urgent care any new/worsening symptoms or further questions or concerns.  Patient understood everything discussed.  All questions were answered.    2) At the completion of the encounter, the patient reported he has been experiencing intermittent right-sided headaches behind his eye for the last several months.  He is not established with primary care therefore ordered referral.  Meantime recommended ibuprofen and Tylenol for symptomatic relief.  Also discussed good sleep hygiene and maintaining fluid hydration.    Differential diagnosis, natural history, supportive care, and indications for immediate follow-up discussed. All questions answered. Patient agrees with the plan of care.    Follow-up as needed if symptoms worsen or fail to improve to PCP, Urgent care or Emergency Room.    2 new diagnoses, (complicated vs uncertain prognosis with regards to the headache), ordered prescription medication    Please note that this dictation was created using voice recognition software. I have made a reasonable attempt to correct obvious errors, but I expect that there are errors of grammar and possibly content that I did not discover  before finalizing the note.

## 2023-10-20 ENCOUNTER — OFFICE VISIT (OUTPATIENT)
Dept: URGENT CARE | Facility: PHYSICIAN GROUP | Age: 38
End: 2023-10-20
Payer: COMMERCIAL

## 2023-10-20 VITALS
HEART RATE: 66 BPM | WEIGHT: 190 LBS | DIASTOLIC BLOOD PRESSURE: 76 MMHG | BODY MASS INDEX: 26.6 KG/M2 | OXYGEN SATURATION: 98 % | SYSTOLIC BLOOD PRESSURE: 118 MMHG | TEMPERATURE: 97.7 F | RESPIRATION RATE: 18 BRPM | HEIGHT: 71 IN

## 2023-10-20 DIAGNOSIS — M62.830 MUSCLE SPASM OF BACK: ICD-10-CM

## 2023-10-20 DIAGNOSIS — M54.50 ACUTE RIGHT-SIDED LOW BACK PAIN WITHOUT SCIATICA: ICD-10-CM

## 2023-10-20 LAB
APPEARANCE UR: CLEAR
BILIRUB UR STRIP-MCNC: NEGATIVE MG/DL
COLOR UR AUTO: YELLOW
GLUCOSE UR STRIP.AUTO-MCNC: NEGATIVE MG/DL
KETONES UR STRIP.AUTO-MCNC: NEGATIVE MG/DL
LEUKOCYTE ESTERASE UR QL STRIP.AUTO: NEGATIVE
NITRITE UR QL STRIP.AUTO: NEGATIVE
PH UR STRIP.AUTO: 7 [PH] (ref 5–8)
PROT UR QL STRIP: NEGATIVE MG/DL
RBC UR QL AUTO: NEGATIVE
SP GR UR STRIP.AUTO: 1.02
UROBILINOGEN UR STRIP-MCNC: 0.2 MG/DL

## 2023-10-20 PROCEDURE — 3078F DIAST BP <80 MM HG: CPT | Performed by: NURSE PRACTITIONER

## 2023-10-20 PROCEDURE — 3074F SYST BP LT 130 MM HG: CPT | Performed by: NURSE PRACTITIONER

## 2023-10-20 PROCEDURE — 99213 OFFICE O/P EST LOW 20 MIN: CPT | Performed by: NURSE PRACTITIONER

## 2023-10-20 PROCEDURE — 81002 URINALYSIS NONAUTO W/O SCOPE: CPT | Performed by: NURSE PRACTITIONER

## 2023-10-20 RX ORDER — KETOROLAC TROMETHAMINE 30 MG/ML
30 INJECTION, SOLUTION INTRAMUSCULAR; INTRAVENOUS ONCE
Status: COMPLETED | OUTPATIENT
Start: 2023-10-20 | End: 2023-10-20

## 2023-10-20 RX ORDER — METHYLPREDNISOLONE 4 MG/1
TABLET ORAL
Qty: 21 TABLET | Refills: 0 | Status: SHIPPED | OUTPATIENT
Start: 2023-10-20

## 2023-10-20 RX ORDER — CYCLOBENZAPRINE HCL 5 MG
5 TABLET ORAL 3 TIMES DAILY PRN
Qty: 30 TABLET | Refills: 0 | Status: SHIPPED | OUTPATIENT
Start: 2023-10-20

## 2023-10-20 RX ADMIN — KETOROLAC TROMETHAMINE 30 MG: 30 INJECTION, SOLUTION INTRAMUSCULAR; INTRAVENOUS at 10:17

## 2023-10-20 ASSESSMENT — ENCOUNTER SYMPTOMS
BACK PAIN: 1
WEAKNESS: 0
FOCAL WEAKNESS: 0
PALPITATIONS: 0
CHILLS: 0
HEADACHES: 0
SHORTNESS OF BREATH: 0
ABDOMINAL PAIN: 0
FEVER: 0
CONSTIPATION: 0
MYALGIAS: 1
DIZZINESS: 0
VOMITING: 0
NAUSEA: 0
DIARRHEA: 0
SENSORY CHANGE: 0
ORTHOPNEA: 0
FALLS: 0
TINGLING: 0
FLANK PAIN: 0

## 2023-10-20 NOTE — PROGRESS NOTES
Subjective     Ki Alba is a 38 y.o. male who presents with Back Pain (X2weeks. R side lower back pain.)            Back Pain  Pertinent negatives include no abdominal pain, chest pain, dysuria, fever, headaches, tingling or weakness.    has been experiencing right-sided low back pain x2 weeks.   did stay in a air B&B on vacation and slept in a bed that created back pain.   then again bent over and felt a stretching feeling to his right lower back as well.  Has been seen by a chiropractor twice this week but did not help.  Cool compress causes more discomfort.  Warm showers in the morning helps to release muscle pain but will continue during the day.   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  No previous low back problems or noted surgeries.  Has attempted to stretch out his back but continues to have pain.  No noted problems with bowel movements or urination.    PMH:  has no past medical history on file.  MEDS:   Current Outpatient Medications:     methylPREDNISolone (MEDROL DOSEPAK) 4 MG Tablet Therapy Pack, Follow schedule on package instructions., Disp: 21 Tablet, Rfl: 0    cyclobenzaprine (FLEXERIL) 5 mg tablet, Take 1 Tablet by mouth 3 times a day as needed for Muscle Spasms. Causes drowsiness, do not drive or work while using. Caution with use with other sedating medications., Disp: 30 Tablet, Rfl: 0    meloxicam (MOBIC) 7.5 MG Tab, Take 1 Tablet by mouth 1 time a day as needed for Mild Pain or Moderate Pain. (Patient not taking: Reported on 1/4/2023), Disp: 15 Tablet, Rfl: 0    cetirizine (ZYRTEC ALLERGY) 10 MG Tab, Take 1 Tab by mouth every day. (Patient not taking: Reported on 4/7/2022), Disp: 30 Tab, Rfl: 0    fluticasone (FLONASE) 50 MCG/ACT nasal spray, Spray 1 Spray in nose every day. (Patient not taking: Reported on 4/7/2022), Disp: 16 g, Rfl: 0    benzonatate (TESSALON) 100 MG Cap, Take 1 Cap by mouth 3 times a day  "as needed for Cough. (Patient not taking: Reported on 4/7/2022), Disp: 60 Cap, Rfl: 0    DM-Doxylamine-Acetaminophen 15-6. MG/15ML Liquid, Take 1 Dose by mouth every 6 hours as needed. (Patient not taking: Reported on 4/7/2022), Disp: , Rfl:     Current Facility-Administered Medications:     ketorolac (Toradol) injection 30 mg, 30 mg, Intramuscular, Once, ROBERTO Almeida  ALLERGIES: No Known Allergies  SURGHX:   Past Surgical History:   Procedure Laterality Date    LACERATION REPAIR N/A 1/29/2017    Procedure: LACERATION REPAIR- complex repair upper lip ;  Surgeon: Omid Flowers M.D.;  Location: SURGERY [facility];  Service:      SOCHX:  reports that he has been smoking cigarettes. His smokeless tobacco use includes chew. He reports current alcohol use. He reports current drug use. Drug: Inhaled.  FH: Family history was reviewed, no pertinent findings to report      Review of Systems   Constitutional:  Negative for chills, fever and malaise/fatigue.   Respiratory:  Negative for shortness of breath.    Cardiovascular:  Negative for chest pain, palpitations and orthopnea.   Gastrointestinal:  Negative for abdominal pain, constipation, diarrhea, nausea and vomiting.   Genitourinary:  Negative for dysuria, flank pain, frequency, hematuria and urgency.   Musculoskeletal:  Positive for back pain and myalgias. Negative for falls and joint pain.   Skin:  Negative for itching and rash.   Neurological:  Negative for dizziness, tingling, sensory change, focal weakness, weakness and headaches.   All other systems reviewed and are negative.             Objective     /76   Pulse 66   Temp 36.5 °C (97.7 °F) (Temporal)   Resp 18   Ht 1.803 m (5' 11\")   Wt 86.2 kg (190 lb)   SpO2 98%   BMI 26.50 kg/m²      Physical Exam  Vitals reviewed.   Constitutional:       General: He is awake. He is not in acute distress.     Appearance: Normal appearance. He is well-developed. He is not ill-appearing, " toxic-appearing or diaphoretic.   HENT:      Head: Normocephalic.   Cardiovascular:      Rate and Rhythm: Normal rate.   Pulmonary:      Effort: Pulmonary effort is normal.   Musculoskeletal:      Lumbar back: Spasms and tenderness present. No swelling, edema, deformity, signs of trauma, lacerations or bony tenderness. Decreased range of motion. Positive right straight leg raise test. Negative left straight leg raise test. No scoliosis.        Back:       Comments: Tripoding position while standing during exam due to discomfort with sitting.   Skin:     General: Skin is warm and dry.   Neurological:      Mental Status: He is alert and oriented to person, place, and time.   Psychiatric:         Attention and Perception: Attention normal.         Mood and Affect: Mood normal.         Speech: Speech normal.         Behavior: Behavior normal. Behavior is cooperative.                             Assessment & Plan        1. Acute right-sided low back pain without sciatica    - ketorolac (Toradol) injection 30 mg  - methylPREDNISolone (MEDROL DOSEPAK) 4 MG Tablet Therapy Pack; Follow schedule on package instructions.  Dispense: 21 Tablet; Refill: 0  - POCT Urinalysis    2. Muscle spasm of back    - cyclobenzaprine (FLEXERIL) 5 mg tablet; Take 1 Tablet by mouth 3 times a day as needed for Muscle Spasms. Causes drowsiness, do not drive or work while using. Caution with use with other sedating medications.  Dispense: 30 Tablet; Refill: 0     -Take over the counter Tylenol as needed for back discomfort, avoid any Ibuprofen products with oral steroid use  -May use cool compresses for any swelling /throbbing pain and warm compresses for muscle stiffness   -May perform gentle back muscle stretches as tolerated after warm compresses to maintain mobility, avoid abdominal crunches, heavy lifting or repetitive motions  -May use Flexeril as directed when at home only due to drowsiness  -May apply topical analgesics as needed  (preferred lidocaine based topical like salon Pas)  -Perform proper body mechanics with lifting, twisting, bending and walking. Ask for assistance with heavy objects as needed   -Monitor for bowel/urination problems, numbness/tingling in extremities, decreased range of shweta with ambulation difficulty- need re-evaluation

## 2024-08-26 ENCOUNTER — OFFICE VISIT (OUTPATIENT)
Dept: URGENT CARE | Facility: PHYSICIAN GROUP | Age: 39
End: 2024-08-26
Payer: COMMERCIAL

## 2024-08-26 VITALS
TEMPERATURE: 97.8 F | HEART RATE: 86 BPM | DIASTOLIC BLOOD PRESSURE: 62 MMHG | BODY MASS INDEX: 26.28 KG/M2 | HEIGHT: 72 IN | WEIGHT: 194 LBS | OXYGEN SATURATION: 98 % | SYSTOLIC BLOOD PRESSURE: 122 MMHG | RESPIRATION RATE: 16 BRPM

## 2024-08-26 DIAGNOSIS — L98.9 SKIN LESION OF BACK: ICD-10-CM

## 2024-08-26 DIAGNOSIS — L08.9 SKIN INFECTION: ICD-10-CM

## 2024-08-26 PROCEDURE — 3078F DIAST BP <80 MM HG: CPT

## 2024-08-26 PROCEDURE — 99213 OFFICE O/P EST LOW 20 MIN: CPT

## 2024-08-26 PROCEDURE — 3074F SYST BP LT 130 MM HG: CPT

## 2024-08-26 RX ORDER — SULFAMETHOXAZOLE/TRIMETHOPRIM 800-160 MG
1 TABLET ORAL 2 TIMES DAILY
Qty: 14 TABLET | Refills: 0 | Status: SHIPPED | OUTPATIENT
Start: 2024-08-26 | End: 2024-09-02

## 2024-08-26 NOTE — PROGRESS NOTES
"Subjective:   Ki Alba is a 39 y.o. male who presents for Insect Bite (X 5 days insect bite on Lft upper back)      HPI  Patient noticed a bump to his back five days ago.       Negative: hx of MRSA, hx of similar lesion, Changes to medications, changes to personal hygiene products, changes to laundry soap, rash, facial swelling, tongue/uvula swelling, difficulty breathing, sore throat, inability to swallow fluids, arthralgias, nausea, vomiting, chills, body aches, recent camping or hiking, recent travel                  Review of Systems   Skin:         Bug bite to back   All other systems reviewed and are negative.      Medical History:  History reviewed. No pertinent past medical history.    Allergies:  No Known Allergies    Social history, surgical history, medications, and current problem list reviewed today in Epic.       Objective:     /62   Pulse 86   Temp 36.6 °C (97.8 °F) (Temporal)   Resp 16   Ht 1.822 m (5' 11.75\")   Wt 88 kg (194 lb)   SpO2 98%     PROCEDURE:  Risks versus benefits and procedure explained. Verbal informed consent obtained.  Anesthesia was refused.  Drape and prep completed in normal sterile fashion. The center was aspirated using an 23-gauge needle and 3 cc syringe. No fluid aspirated. The aspiration point was dressed with bandaid. patient tolerated the procedure well with no complications. They verbalized understanding of follow up and return precautions.         Physical Exam  Vitals reviewed.   Constitutional:       General: He is not in acute distress.     Appearance: Normal appearance. He is not ill-appearing, toxic-appearing or diaphoretic.   Cardiovascular:      Rate and Rhythm: Normal rate.      Pulses: Normal pulses.   Pulmonary:      Effort: Pulmonary effort is normal.   Musculoskeletal:         General: Normal range of motion.      Cervical back: Normal range of motion.   Skin:     General: Skin is warm.      Capillary Refill: Capillary refill takes less than " 2 seconds.      Findings: Lesion present.             Comments: 5 cm x 2 cm area of erythema with 1 cm x 1 cm indurated center. No fluctuance. No crepitus.  No drainage   Neurological:      General: No focal deficit present.      Mental Status: He is alert and oriented to person, place, and time.   Psychiatric:         Mood and Affect: Mood normal.         Behavior: Behavior normal.         Assessment/Plan:       Diagnosis and associated orders:     1. Skin infection  - sulfamethoxazole-trimethoprim (BACTRIM DS) 800-160 MG tablet; Take 1 Tablet by mouth 2 times a day for 7 days.  Dispense: 14 Tablet; Refill: 0    2. Skin lesion of back     Comments/MDM:       39-year-old afebrile, hemodynamically stable, generally well-appearing male presenting with complaints of a red bump to his back for 5 days.  Not concerned with abscess at this time. No fluid aspirated with syringe.   Patient agreeable to conservative treatment of Bactrim twice daily for 7 days.  Patient given strict instructions to return to the clinic 72 hours if area fails to improve or sooner if redness worsens.      Patient is clinically stable at today's acute urgent care visit. Vital signs are normal and reassuring.  No acute distress noted. Appropriate for outpatient management at this time. No red flag warnings noted.  Patient given strict instructions to follow up with emergency room if they develop any red flag warnings which were discussed in depth.  They verbalized understanding.      Differential diagnosis, natural history, supportive care, and indications for immediate follow-up discussed. All questions answered. Patient agrees with the plan of care. Advised the patient to follow-up with the primary care physician for recheck, reevaluation, and consideration of further management or the emergency room for worsening symptoms.      Please note that this dictation was created using voice recognition software. I have made every reasonable attempt to  correct obvious errors, but I expect that there are errors of grammar and possibly content that I did not discover before finalizing the note.

## (undated) DEVICE — SUTURE GENERAL

## (undated) DEVICE — ELECTRODE DUAL RETURN W/ CORD - (50/PK)

## (undated) DEVICE — TUBE CONNECTING SUCTION - CLEAR PLASTIC STERILE 72 IN (50EA/CA)

## (undated) DEVICE — KIT ANESTHESIA W/CIRCUIT & 3/LT BAG W/FILTER (20EA/CA)

## (undated) DEVICE — LACTATED RINGERS INJ 1000 ML - (14EA/CA 60CA/PF)

## (undated) DEVICE — KIT ROOM DECONTAMINATION

## (undated) DEVICE — HEAD HOLDER JUNIOR/ADULT

## (undated) DEVICE — SENSOR SPO2 NEO LNCS ADHESIVE (20/BX) SEE USER NOTES

## (undated) DEVICE — TUBE E-T HI-LO CUFF 7.5MM (10EA/PK)

## (undated) DEVICE — SUTURE 5-0 CHROMIC GUT PS-5 - (12/BX) 18 INCH

## (undated) DEVICE — PROTECTOR ULNA NERVE - (36PR/CA)

## (undated) DEVICE — GLOVE, LITE (PAIR)

## (undated) DEVICE — GOWN WARMING STANDARD FLEX - (30/CA)

## (undated) DEVICE — SUTURE 4-0 CHROMIC GUT - 18 INCH G-3 D/A (12/BX)

## (undated) DEVICE — TRAY SRGPRP PVP IOD WT PRP - (20/CA)

## (undated) DEVICE — SODIUM CHL IRRIGATION 0.9% 1000ML (12EA/CA)

## (undated) DEVICE — BAG, SPONGE COUNT 50600

## (undated) DEVICE — SUTURE 5-0 PROLENE P-3 - (12/BX)

## (undated) DEVICE — HUMID-VENT HEAT AND MOISTURE EXCHANGE- (50/BX)

## (undated) DEVICE — GOWN SURGICAL XX-LARGE - (28EA/CA) SIRUS NON REINFORCED

## (undated) DEVICE — PACK MINOR BASIN - (2EA/CA)

## (undated) DEVICE — CLOSURE SKIN STRIP 1/2 X 4 IN - (STERI STRIP) (50/BX 4BX/CA)

## (undated) DEVICE — GLOVE BIOGEL SZ 8.5 SURGICAL PF LTX - (50PR/BX 4BX/CA)

## (undated) DEVICE — MASK ANESTHESIA ADULT  - (100/CA)

## (undated) DEVICE — ELECTRODE 850 FOAM ADHESIVE - HYDROGEL RADIOTRNSPRNT (50/PK)

## (undated) DEVICE — SUTURE 5-0 VICRYL PLUS P-3 18 (36PK/BX)"

## (undated) DEVICE — GLOVE BIOGEL SZ 8 SURGICAL PF LTX - (50PR/BX 4BX/CA)

## (undated) DEVICE — WATER IRRIGATION STERILE 1000ML (12EA/CA)

## (undated) DEVICE — SUCTION INSTRUMENT YANKAUER BULBOUS TIP W/O VENT (50EA/CA)

## (undated) DEVICE — CANISTER SUCTION RIGID RED 1500CC (40EA/CA)

## (undated) DEVICE — DRAPE SURGICAL U 77X120 - (10/CA)